# Patient Record
Sex: MALE | Race: WHITE | Employment: OTHER | URBAN - METROPOLITAN AREA
[De-identification: names, ages, dates, MRNs, and addresses within clinical notes are randomized per-mention and may not be internally consistent; named-entity substitution may affect disease eponyms.]

---

## 2017-08-07 ENCOUNTER — TELEPHONE (OUTPATIENT)
Dept: FAMILY MEDICINE CLINIC | Age: 62
End: 2017-08-07

## 2017-08-07 DIAGNOSIS — Z00.00 EXAMINATION, MEDICAL, GENERAL: ICD-10-CM

## 2017-08-07 DIAGNOSIS — E78.5 HYPERLIPIDEMIA, UNSPECIFIED HYPERLIPIDEMIA TYPE: Primary | ICD-10-CM

## 2017-08-07 DIAGNOSIS — Z12.5 SCREENING FOR PROSTATE CANCER: ICD-10-CM

## 2017-09-02 LAB
A/G RATIO: 0.9 (ref 1–2)
ALBUMIN SERPL-MCNC: 8.4 G/DL (ref 6.4–8.2)
ALBUMIN SERUM: 3.9 G/DL (ref 3.4–5)
ALP BLD-CCNC: 68 U/L (ref 45–117)
ALT SERPL-CCNC: 26 U/L (ref 12–78)
ANION GAP SERPL CALCULATED.3IONS-SCNC: 6 MMOL/L (ref 7–16)
AST SERPL-CCNC: 22 U/L (ref 15–37)
BILIRUBIN: 0.8 MG/DL (ref 0.2–1)
BUN BLDV-MCNC: 12 MG/DL (ref 7–18)
CALCIUM SERPL-MCNC: 8.9 MG/DL (ref 8.5–10.1)
CHLORIDE BLD-SCNC: 100 MMOL/L (ref 98–107)
CHOLESTEROL, STONE: 202 MG/DL
CO2: 30 MMOL/L (ref 21–32)
CREATININE + EGFR PANEL: 1.3 MG/DL (ref 0.6–1.3)
GFR AFRICAN AMERICAN: > 60 ML/MIN/1.73M2
GFR NON-AFRICAN AMERICAN: 56 ML/MIN/1.73M2
GLOBULIN: 4.5 G/DL (ref 2.6–4.2)
GLUCOSE: 99 MG/DL (ref 74–106)
HDLC SERPL-MCNC: 51 MG/DL (ref 40–60)
LDL CHOLESTEROL CALCULATED: 138 MG/DL (ref 0–99)
POTASSIUM SERPL-SCNC: 4.4 MMOL/L (ref 3.5–5.1)
PROSTATE SPECIFIC ANTIGEN PERCENT FREE: 1.33 NG/ML (ref 0.01–4)
SODIUM BLD-SCNC: 136 MMOL/L (ref 136–145)
TRIGL SERPL-MCNC: 65 MG/DL (ref 0–149)
VLDLC SERPL CALC-MCNC: 13 MG/DL (ref 0–40)

## 2017-09-11 NOTE — PRE-PROCEDURE INSTRUCTIONS
Patient not reached. Preop instructions left on voice mail.     -Date,time,place given  -Nothing to eat or drink after midnight  -Responsible adult 25 or older to stay on site while you are here and drive you home and stay with you after  -Follow any instructions your doctors office has given you  -Bring a complete list of all your medications and supplements  -If you normally take the following medications in the morning please do so with a small    sip of water-heart,blood pressure,seizure,breathing or thyroid-avoid water pillls and any   medication ending in \"pril\"-you may use your inhalers  -Take half of your normal dose of any long acting insulins the night before-do not take    any diabetic medications in the morning  -Follow your doctors instructions regarding blood thinners  -Any questions call your surgeons office  PAT Dept has ordered labs/ekg based on limited history available to us. After patient interview is completed additional labs etc.may be needed and will need to be ordered per anesthesia or per anesthesia protocol

## 2017-10-03 ENCOUNTER — OFFICE VISIT (OUTPATIENT)
Dept: FAMILY MEDICINE CLINIC | Age: 62
End: 2017-10-03

## 2017-10-03 VITALS
SYSTOLIC BLOOD PRESSURE: 154 MMHG | HEART RATE: 73 BPM | BODY MASS INDEX: 31.58 KG/M2 | OXYGEN SATURATION: 97 % | DIASTOLIC BLOOD PRESSURE: 110 MMHG | HEIGHT: 75 IN | WEIGHT: 254 LBS

## 2017-10-03 DIAGNOSIS — M25.561 CHRONIC PAIN OF RIGHT KNEE: ICD-10-CM

## 2017-10-03 DIAGNOSIS — N52.9 ERECTILE DYSFUNCTION, UNSPECIFIED ERECTILE DYSFUNCTION TYPE: ICD-10-CM

## 2017-10-03 DIAGNOSIS — J30.81 ALLERGIC RHINITIS DUE TO ANIMAL HAIR AND DANDER: ICD-10-CM

## 2017-10-03 DIAGNOSIS — G89.29 CHRONIC PAIN OF RIGHT KNEE: ICD-10-CM

## 2017-10-03 DIAGNOSIS — R30.0 DYSURIA: ICD-10-CM

## 2017-10-03 DIAGNOSIS — R03.0 ELEVATED BLOOD PRESSURE READING: ICD-10-CM

## 2017-10-03 DIAGNOSIS — Z00.00 PREVENTATIVE HEALTH CARE: Primary | ICD-10-CM

## 2017-10-03 DIAGNOSIS — R13.10 DYSPHAGIA, UNSPECIFIED TYPE: ICD-10-CM

## 2017-10-03 LAB
APPEARANCE FLUID: CLEAR
BILIRUBIN, POC: NORMAL
BLOOD URINE, POC: 0
CLARITY, POC: CLEAR
COLOR, POC: YELLOW
GLUCOSE URINE, POC: 0
KETONES, POC: 0
LEUKOCYTE EST, POC: 0
NITRITE, POC: 0
PH, POC: 5
PROTEIN, POC: NORMAL
SPECIFIC GRAVITY, POC: 1.03
UROBILINOGEN, POC: 0

## 2017-10-03 PROCEDURE — 81002 URINALYSIS NONAUTO W/O SCOPE: CPT | Performed by: FAMILY MEDICINE

## 2017-10-03 PROCEDURE — 99386 PREV VISIT NEW AGE 40-64: CPT | Performed by: FAMILY MEDICINE

## 2017-10-03 PROCEDURE — 90471 IMMUNIZATION ADMIN: CPT | Performed by: FAMILY MEDICINE

## 2017-10-03 PROCEDURE — 90715 TDAP VACCINE 7 YRS/> IM: CPT | Performed by: FAMILY MEDICINE

## 2017-10-03 ASSESSMENT — PATIENT HEALTH QUESTIONNAIRE - PHQ9
2. FEELING DOWN, DEPRESSED OR HOPELESS: 0
1. LITTLE INTEREST OR PLEASURE IN DOING THINGS: 0
SUM OF ALL RESPONSES TO PHQ QUESTIONS 1-9: 0
SUM OF ALL RESPONSES TO PHQ9 QUESTIONS 1 & 2: 0

## 2017-10-03 ASSESSMENT — ENCOUNTER SYMPTOMS
TROUBLE SWALLOWING: 0
WHEEZING: 0
EYE PAIN: 0
ABDOMINAL DISTENTION: 0
COUGH: 0
CONSTIPATION: 0
VOMITING: 0
BACK PAIN: 0
NAUSEA: 0
EYE DISCHARGE: 0
DIARRHEA: 0
ROS SKIN COMMENTS: NO CHANGES IN MOLES. NO SKIN LESIONS.
SHORTNESS OF BREATH: 0
ABDOMINAL PAIN: 0

## 2017-10-03 NOTE — MR AVS SNAPSHOT
· Eat 4 to 5 servings of fruit each day. A serving is 1 medium-sized piece of fruit, ½ cup chopped or canned fruit, 1/4 cup dried fruit, or 4 ounces (½ cup) of fruit juice. Choose fruit more often than fruit juice. · Eat 4 to 5 servings of vegetables each day. A serving is 1 cup of lettuce or raw leafy vegetables, ½ cup of chopped or cooked vegetables, or 4 ounces (½ cup) of vegetable juice. Choose vegetables more often than vegetable juice. · Get 2 to 3 servings of low-fat and fat-free dairy each day. A serving is 8 ounces of milk, 1 cup of yogurt, or 1 ½ ounces of cheese. · Eat 6 to 8 servings of grains each day. A serving is 1 slice of bread, 1 ounce of dry cereal, or ½ cup of cooked rice, pasta, or cooked cereal. Try to choose whole-grain products as much as possible. · Limit lean meat, poultry, and fish to 2 servings each day. A serving is 3 ounces, about the size of a deck of cards. · Eat 4 to 5 servings of nuts, seeds, and legumes (cooked dried beans, lentils, and split peas) each week. A serving is 1/3 cup of nuts, 2 tablespoons of seeds, or ½ cup of cooked beans or peas. · Limit fats and oils to 2 to 3 servings each day. A serving is 1 teaspoon of vegetable oil or 2 tablespoons of salad dressing. · Limit sweets and added sugars to 5 servings or less a week. A serving is 1 tablespoon jelly or jam, ½ cup sorbet, or 1 cup of lemonade. · Eat less than 2,300 milligrams (mg) of sodium a day. If you limit your sodium to 1,500 mg a day, you can lower your blood pressure even more. Tips for success  · Start small. Do not try to make dramatic changes to your diet all at once. You might feel that you are missing out on your favorite foods and then be more likely to not follow the plan. Make small changes, and stick with them. Once those changes become habit, add a few more changes. · Try some of the following:  ¨ Make it a goal to eat a fruit or vegetable at every meal and at snacks. This will make it easy to get the recommended amount of fruits and vegetables each day. ¨ Try yogurt topped with fruit and nuts for a snack or healthy dessert. ¨ Add lettuce, tomato, cucumber, and onion to sandwiches. ¨ Combine a ready-made pizza crust with low-fat mozzarella cheese and lots of vegetable toppings. Try using tomatoes, squash, spinach, broccoli, carrots, cauliflower, and onions. ¨ Have a variety of cut-up vegetables with a low-fat dip as an appetizer instead of chips and dip. ¨ Sprinkle sunflower seeds or chopped almonds over salads. Or try adding chopped walnuts or almonds to cooked vegetables. ¨ Try some vegetarian meals using beans and peas. Add garbanzo or kidney beans to salads. Make burritos and tacos with mashed nixon beans or black beans. Where can you learn more? Go to https://[email or web address].Panasas. org and sign in to your [employer] account. Enter G143 in the Subject Company box to learn more about \"DASH Diet: Care Instructions. \"     If you do not have an account, please click on the \"Sign Up Now\" link. Current as of: April 3, 2017  Content Version: 11.3  © 8279-5940 [email or web address], [employer]orporated. Care instructions adapted under license by [facility] ([facility]). If you have questions about a medical condition or this instruction, always ask your healthcare professional. [place] [address] any warranty or liability for your use of this information. Medications and Orders      Your Current Medications Are              Glucosamine-Chondroitin (OSTEO BI-FLEX REGULAR STRENGTH PO) Take by mouth    multivitamin (THERAGRAN) per tablet Take 1 tablet by mouth daily.       prednisoLONE acetate (PRED FORTE) 1 % ophthalmic suspension       Allergies              Deconamine [Chlorpheniramine-pseudoeph] Other (See Comments)    Hallucinations    Dorzolamide     Increased eye pressure    Viagra [Sildenafil Citrate] Other (See Comments)    Temporary blindness 4. Enter your Social Security Number ([id number]) and Date of Birth (mm/dd/yyyy) as indicated and click Submit. You will be taken to the next sign-up page. 5. Create a [employer] ID. This will be your [employer] login ID and cannot be changed, so think of one that is secure and easy to remember. 6. Create a [employer] password. You can change your password at any time. 7. Enter your Password Reset Question and Answer. This can be used at a later time if you forget your password. 8. Enter your e-mail address. You will receive e-mail notification when new information is available in [address] 9. Click Sign Up. You can now view your medical record. Additional Information  If you have questions, please contact the physician practice where you receive care. Remember, [employer] is NOT to be used for urgent needs. For medical emergencies, dial 911. For questions regarding your [employer] account call [phone]. If you have a clinical question, please call your doctor's office.

## 2017-10-03 NOTE — PROGRESS NOTES
Edward Celeste  : 1955  Encounter date: 10/3/2017    This is a 63 y.o. male who presents to [facility] care. Chief Complaint   Patient presents with    Annual Exam       History of present illness:    Has colonoscopy scheduled tomorrow with Dr. Arnold Good. Had polyp removed 5 yrs ago; tomorrow is 5 year check up. Right knee is bothering him. Has some arthritis in knee. Was using treadmill in morning just walking, and can't do that anymore. Causing pain. Wears knee brace during day which keeps it from hurting at night. Pain is medial. Bothers him more when up and on knee. Occasionally takes aleve. Swelling better with brace. Had \"blood poisoning\" in that knee in high school. Wants to know if exercise is making it worse or if he should continue. Does use stationary bike on regular basis - 20 minutes or so. Does check blood pressure at home - usually 130's/90. Has been in that range about last 4 years. Usually over 90 for diastolic. Past Medical History:   Diagnosis Date    Erectile dysfunction     Hyperlipidemia      Past Surgical History:   Procedure Laterality Date    COLONOSCOPY  2012    RETINAL DETACHMENT SURGERY      Bilateral- (L) Blind-Cosmetic Contact; 12 surgeries on left; 8 on right     Allergies   Allergen Reactions    Deconamine [Chlorpheniramine-Pseudoeph] Other (See Comments)     Hallucinations    Dorzolamide      Increased eye pressure    Viagra [Sildenafil Citrate] Other (See Comments)     Temporary blindness     Outpatient Prescriptions Marked as Taking for the 10/3/17 encounter (Office Visit) with Stephanie Lennon MD   Medication Sig Dispense Refill    Glucosamine-Chondroitin (OSTEO BI-FLEX REGULAR STRENGTH PO) Take by mouth      multivitamin (THERAGRAN) per tablet Take 1 tablet by mouth daily.         prednisoLONE acetate (PRED FORTE) 1 % ophthalmic suspension        Social History   Substance Use Topics    Smoking status: Never Smoker    Smokeless tobacco: Never Used   [facility] Encounters:   10/03/17 (!) 142/90   10/31/12 110/80     Wt Readings from Last 3 Encounters:   10/03/17 254 lb (115.2 kg)   10/31/12 251 lb (113.9 kg)       Physical Exam   Constitutional: He is oriented to person, place, and time. He appears well-developed and well-nourished. No distress. HENT:   Head: Normocephalic and atraumatic. Right Ear: Hearing, tympanic membrane, external ear and ear canal normal.   Left Ear: Hearing, tympanic membrane, external ear and ear canal normal.   Nose: Nose normal. No mucosal edema. Mouth/Throat: Uvula is midline and oropharynx is clear and moist.   Eyes: Conjunctivae and EOM are normal. Pupils are equal, round, and reactive to light. Cataract left eye   Neck: Trachea normal. No thyroid mass and no thyromegaly present. Cardiovascular: Normal rate and regular rhythm. Pulses:       Radial pulses are 2+ on the right side, and 2+ on the left side. Dorsalis pedis pulses are 2+ on the right side, and 2+ on the left side. Pulmonary/Chest: Effort normal and breath sounds normal.   Abdominal: Soft. Normal appearance and bowel sounds are normal. There is no tenderness. There is no CVA tenderness. No hernia. Hernia confirmed negative in the right inguinal area and confirmed negative in the left inguinal area. Genitourinary: Testes normal and penis normal. Right testis shows no mass, no swelling and no tenderness. Right testis is descended. Left testis shows no mass, no swelling and no tenderness. Left testis is descended. Circumcised. Musculoskeletal: Normal range of motion. He exhibits no tenderness. Right knee has significant crepitance on exam. Knee exam is stable without induced tenderness. Lymphadenopathy:     He has no cervical adenopathy. Neurological: He is alert and oriented to person, place, and time. He has normal strength. He displays no tremor. No cranial nerve deficit. He exhibits normal muscle tone.    Reflex Scores:       Bicep reflexes are 2+ on the right side and 2+ on the left side. Brachioradialis reflexes are 2+ on the right side and 2+ on the left side. Patellar reflexes are 2+ on the right side and 2+ on the left side. Psychiatric: He has a normal mood and affect. His speech is normal and behavior is normal.       Assessment/Plan:    1.dysphagia  We did try to get patient in for esophagogastroduodenoscopy with his colonoscopy tomorrow but they're unable to do so. We will keep the esophagogastroduodenoscopy order an system suspect he has some stricture causing the occasional dysphagia.  - EGD    2. Elevated blood pressure reading  Blood pressure and recheck in the office was in the 150s / 110s. This was checked twice during the visit. Patient prefers to make lifestyle changes to his blood pressure but will return on Friday with home cuff to compare against ours and documented readings at home. If still elevated on this recheck recommended we start medication for blood pressure possibly lisinopril to keep it healthier eating. He is working on losing weight and we discussed that if he doesn't lose weight we may be able to take him off of blood pressure medication in the future. 3. Chronic pain of right knee  Right knee pain has worsened over the last few years. On exam knee feels to be arthritic. We will start with a baseline x-ray and consider further treatment pending this. He is not interested in surgery at this point however he may benefit from a steroid injection to the left knee. - XR KNEE RIGHT (3 VIEWS); Future    4. Preventative health care  He will get flu shot through work. Otherwise he is up-to-date with preventative healthcare. 5. Erectile dysfunction, unspecified erectile dysfunction type  Discussed if he is interested in treatment they're more localized treatment options including injections and pills that would not effect eyes like the systemic Viagra or Cialis would.  If he is interested down the road we can consider discussion with urology about these treatment options. 6. Allergic rhinitis due to animal hair and dander  Controlled with Mucinex. 7. Dysuria  Plan UA in office today. Normal.  - POC URINE with Microscopic    Return in about 3 days (around 10/6/2017) for nurse visit-bp recheck.

## 2017-10-03 NOTE — PATIENT INSTRUCTIONS
cup of yogurt, or 1 ½ ounces of cheese. · Eat 6 to 8 servings of grains each day. A serving is 1 slice of bread, 1 ounce of dry cereal, or ½ cup of cooked rice, pasta, or cooked cereal. Try to choose whole-grain products as much as possible. · Limit lean meat, poultry, and fish to 2 servings each day. A serving is 3 ounces, about the size of a deck of cards. · Eat 4 to 5 servings of nuts, seeds, and legumes (cooked dried beans, lentils, and split peas) each week. A serving is 1/3 cup of nuts, 2 tablespoons of seeds, or ½ cup of cooked beans or peas. · Limit fats and oils to 2 to 3 servings each day. A serving is 1 teaspoon of vegetable oil or 2 tablespoons of salad dressing. · Limit sweets and added sugars to 5 servings or less a week. A serving is 1 tablespoon jelly or jam, ½ cup sorbet, or 1 cup of lemonade. · Eat less than 2,300 milligrams (mg) of sodium a day. If you limit your sodium to 1,500 mg a day, you can lower your blood pressure even more. Tips for success  · Start small. Do not try to make dramatic changes to your diet all at once. You might feel that you are missing out on your favorite foods and then be more likely to not follow the plan. Make small changes, and stick with them. Once those changes become habit, add a few more changes. · Try some of the following:  ¨ Make it a goal to eat a fruit or vegetable at every meal and at snacks. This will make it easy to get the recommended amount of fruits and vegetables each day. ¨ Try yogurt topped with fruit and nuts for a snack or healthy dessert. ¨ Add lettuce, tomato, cucumber, and onion to sandwiches. ¨ Combine a ready-made pizza crust with low-fat mozzarella cheese and lots of vegetable toppings. Try using tomatoes, squash, spinach, broccoli, carrots, cauliflower, and onions. ¨ Have a variety of cut-up vegetables with a low-fat dip as an appetizer instead of chips and dip. ¨ Sprinkle sunflower seeds or chopped almonds over salads.  Or try adding chopped walnuts or almonds to cooked vegetables. ¨ Try some vegetarian meals using beans and peas. Add garbanzo or kidney beans to salads. Make burritos and tacos with mashed nixon beans or black beans. Where can you learn more? Go to https://chpepiceweb.[email or web address] org and sign in to your [employer] account. Enter I090 in the [employer] Me[employer] box to learn more about \"DASH Diet: Care Instructions. \"     If you do not have an account, please click on the \"Sign Up Now\" link. Current as of: April 3, 2017  Content Version: 11.3  © 3434-9820 [employer], Incorporated. Care instructions adapted under license by [facility] ([facility]). If you have questions about a medical condition or this instruction, always ask your healthcare professional. [place] any warranty or liability for your use of this information.

## 2017-10-04 ENCOUNTER — HOSPITAL ENCOUNTER (OUTPATIENT)
Dept: ENDOSCOPY | Age: 62
Discharge: HOME | End: 2017-10-04
Attending: INTERNAL MEDICINE | Admitting: INTERNAL MEDICINE

## 2017-10-04 RX ORDER — SODIUM CHLORIDE 9 MG/ML
INJECTION, SOLUTION INTRAVENOUS CONTINUOUS
Status: DISCONTINUED | OUTPATIENT
Start: 2017-10-04 | End: 2017-10-05 | Stop reason: HOSPADM

## 2017-10-04 RX ORDER — SODIUM CHLORIDE 0.9 % (FLUSH) 0.9 %
10 SYRINGE (ML) INJECTION PRN
Status: DISCONTINUED | OUTPATIENT
Start: 2017-10-04 | End: 2017-10-05 | Stop reason: HOSPADM

## 2017-10-04 RX ORDER — SODIUM CHLORIDE 0.9 % (FLUSH) 0.9 %
10 SYRINGE (ML) INJECTION EVERY 12 HOURS SCHEDULED
Status: DISCONTINUED | OUTPATIENT
Start: 2017-10-04 | End: 2017-10-05 | Stop reason: HOSPADM

## 2017-10-04 NOTE — ANESTHESIA PRE-OP
[facility] Anesthesiology  Pre-Anesthesia Evaluation/Consultation       Name:  Marcel Hercules                                         Age:  63 y.o. MRN:    [id number]           Procedure (Scheduled): COLONOSCOPY   Surgeon:     Dr. Fish Mota MD     Allergies   Allergen Reactions    Deconamine [Chlorpheniramine-Pseudoeph] Other (See Comments)     Hallucinations    Dorzolamide      Increased eye pressure    Viagra [Sildenafil Citrate] Other (See Comments)     Temporary blindness     Patient Active Problem List   Diagnosis    Allergic rhinitis    Erectile dysfunction    Hyperlipidemia     Past Medical History:   Diagnosis Date    Erectile dysfunction     Hyperlipidemia      Past Surgical History:   Procedure Laterality Date    COLONOSCOPY  9/2012    RETINAL DETACHMENT SURGERY      Bilateral- (L) Blind-Cosmetic Contact; 12 surgeries on left; 8 on right     Social History   Substance Use Topics    Smoking status: Never Smoker    Smokeless tobacco: Never Used    Alcohol use Yes      Comment: social       Prior to Admission medications    Medication Sig Start Date End Date Taking? Authorizing Provider   Glucosamine-Chondroitin (OSTEO BI-FLEX REGULAR STRENGTH PO) Take by mouth    Historical Provider, MD   multivitamin [facility]) per tablet Take 1 tablet by mouth daily. Historical Provider, MD   prednisoLONE acetate (PRED FORTE) 1 % ophthalmic suspension  8/14/12   Historical Provider, MD       Current Outpatient Prescriptions   Medication Sig Dispense Refill    Glucosamine-Chondroitin (OSTEO BI-FLEX REGULAR STRENGTH PO) Take by mouth      multivitamin (THERAGRAN) per tablet Take 1 tablet by mouth daily.         prednisoLONE acetate (PRED FORTE) 1 % ophthalmic suspension        Current Facility-Administered Medications   Medication Dose Route Frequency Provider Last Rate Last Dose    0.9 % sodium chloride infusion   Intravenous Continuous Jm Duncan MD        sodium chloride flush 0.9 % injection 10 mL  10 mL Intravenous 2 times per day Ella Carrillo MD        sodium chloride flush 0.9 % injection 10 mL  10 mL Intravenous PRN Ella Carrillo MD           Vital Signs  (Current) There were no vitals filed for this visit.   (for past 48 hrs)  BP  Min: 142/90   Min taken time: 10/03/17 0743  Max: 154/110   Max taken time: 10/03/17 0922  Pulse  Av  Min: 73   Min taken time: 10/03/17 0743  Max: 73   Max taken time: 10/03/17 0743  SpO2  Av %  Min: 97 %   Min taken time: 10/03/17 0743  Max: 97 %   Max taken time: 10/03/17 0743  (last three values)   BP Readings from Last 3 Encounters:   10/03/17 (!) 154/110   10/31/12 110/80       CBC  Lab Results   Component Value Date    WBC 5.7 09/10/2012    RBC 4.61 09/10/2012    HGB 14.2 09/10/2012    HCT 42.5 09/10/2012    MCV 92.2 09/10/2012    RDW 13.7 09/10/2012     09/10/2012       CMP    Lab Results   Component Value Date     2017    K 4.4 2017     2017    CO2 30 2017    BUN 12 2017    CREATININE 0.9 09/10/2012    GFRAA > 60 2017    GFRAA >60 09/10/2012    AGRATIO 0.9 2017    LABGLOM 56 2017    GLUCOSE 99 2017    PROT 7.2 09/10/2012    CALCIUM 8.9 2017    BILITOT 0.8 2017    ALKPHOS 68 2017    AST 22 2017    ALT 26 2017       BMP    Lab Results   Component Value Date     2017    K 4.4 2017     2017    CO2 30 2017    BUN 12 2017    CREATININE 0.9 09/10/2012    CALCIUM 8.9 2017    GFRAA > 60 2017    GFRAA >60 09/10/2012    LABGLOM 56 2017    GLUCOSE 99 2017       Coa   Lab Results   Component Value Date    PROTIME 11.5 09/10/2012    INR 1.01 09/10/2012    APTT 32.1 09/10/2012       HCG (If Applicable) No results found for: PREGTESTUR, PREGSERUM, HCG, HCGQUANT     ABGs  No results found for: PHART, PO2ART, UOH0DSS, WPO6ZVZ, BEART, N1XZENMV     Type & Screen (If Applicable)  No results found for: Jaun Oconnor  No results for input(s): GLUCOSE in the last 72 hours. NPO Status  > 8 hours                       BMI  There is no height or weight on file to calculate BMI. Estimated body mass index is 32.18 kg/(m^2) as calculated from the following:    Height as of 10/3/17: 6' 2.5\" (1.892 m). Weight as of 10/3/17: 254 lb (115.2 kg). Additional Testing (Echo, Stress, ECG, PFTs, etc)        Anesthesia Evaluation  Patient summary reviewed and Nursing notes reviewed no history of anesthetic complications:   Airway: Mallampati: II  TM distance: >3 FB   Neck ROM: full  Mouth opening: > = 3 FB Dental: normal exam         Pulmonary:negative ROS and normal exam  breath sounds clear to auscultation      (-) asthma       Cardiovascular:    (+) hypertension (no med currently, being considered for txmt):, hyperlipidemia    (-) past MI, CAD, CABG/stent, dysrhythmias,  angina and  CHF      Rhythm: regular  Rate: normal                 Neuro/Psych:   {neg ROS     (-) CVA  GI/Hepatic/Renal: neg ROS       (-) GERD     Endo/Other: negative ROS         Abdominal:                    Anesthesia Plan    ASA 2     MAC     intravenous induction   Anesthetic plan and risks discussed with patient. Plan discussed with CRNA. DOS STAFF ADDENDUM:    Pt seen and examined, chart reviewed (including anesthesia, drug and allergy history). No interval changes to history and physical examination. Anesthetic plan, risks, benefits, alternatives, and personnel involved discussed with patient. Patient verbalized an understanding and agrees to proceed.       Aurelio Strange MD  October 4, 2017  6:37 AM

## 2017-10-06 ENCOUNTER — NURSE ONLY (OUTPATIENT)
Dept: FAMILY MEDICINE CLINIC | Age: 62
End: 2017-10-06

## 2017-10-06 DIAGNOSIS — R03.0 ELEVATED BLOOD PRESSURE READING: ICD-10-CM

## 2017-10-10 ENCOUNTER — TELEPHONE (OUTPATIENT)
Dept: FAMILY MEDICINE CLINIC | Age: 62
End: 2017-10-10

## 2017-10-10 NOTE — TELEPHONE ENCOUNTER
Dr. Monica Wu is adding lisinopril 20 mg for patient to take daily. I need to know what pharmacy to send the medication. Left message for patient to call back.

## 2017-10-12 RX ORDER — LISINOPRIL 20 MG/1
20 TABLET ORAL DAILY
Qty: 30 TABLET | Refills: 3 | Status: SHIPPED | OUTPATIENT
Start: 2017-10-12 | End: 2018-07-24

## 2017-10-13 PROBLEM — R03.0 ELEVATED BLOOD PRESSURE READING: Status: ACTIVE | Noted: 2017-10-13

## 2017-10-13 NOTE — PROGRESS NOTES
Patient is here for a blood pressure check. It was 162/110 in the left arm and 151/106 in the left wrist with patient machine.   Patient needs to start taking lisinopril 20 mg daily per Magalie Franco

## 2018-07-24 ENCOUNTER — OFFICE VISIT (OUTPATIENT)
Dept: FAMILY MEDICINE CLINIC | Age: 63
End: 2018-07-24

## 2018-07-24 VITALS
OXYGEN SATURATION: 98 % | DIASTOLIC BLOOD PRESSURE: 82 MMHG | WEIGHT: 248 LBS | SYSTOLIC BLOOD PRESSURE: 108 MMHG | HEART RATE: 64 BPM | BODY MASS INDEX: 31.42 KG/M2

## 2018-07-24 DIAGNOSIS — J30.81 CHRONIC ALLERGIC RHINITIS DUE TO ANIMAL HAIR AND DANDER: ICD-10-CM

## 2018-07-24 DIAGNOSIS — R03.0 ELEVATED BLOOD PRESSURE READING: Primary | ICD-10-CM

## 2018-07-24 DIAGNOSIS — E78.5 HYPERLIPIDEMIA, UNSPECIFIED HYPERLIPIDEMIA TYPE: ICD-10-CM

## 2018-07-24 DIAGNOSIS — Z80.42 FAMILY HISTORY OF PROSTATE CANCER: ICD-10-CM

## 2018-07-24 PROCEDURE — 99214 OFFICE O/P EST MOD 30 MIN: CPT | Performed by: FAMILY MEDICINE

## 2018-07-24 RX ORDER — LISINOPRIL 5 MG/1
5 TABLET ORAL DAILY
Qty: 30 TABLET | Refills: 2 | Status: SHIPPED | OUTPATIENT
Start: 2018-07-24 | End: 2019-03-20 | Stop reason: SDUPTHER

## 2018-07-24 RX ORDER — PREDNISOLONE ACETATE 10 MG/ML
SUSPENSION/ DROPS OPHTHALMIC
COMMUNITY
End: 2018-07-24

## 2018-07-24 ASSESSMENT — ENCOUNTER SYMPTOMS
SHORTNESS OF BREATH: 0
ABDOMINAL PAIN: 0
COUGH: 0

## 2018-10-04 DIAGNOSIS — E83.51 LOW CALCIUM LEVELS: Primary | ICD-10-CM

## 2018-10-04 LAB — PARATHYROID HORMONE INTACT: 68.7 PG/ML (ref 10–69)

## 2018-10-12 DIAGNOSIS — E83.51 LOW CALCIUM LEVELS: Primary | ICD-10-CM

## 2019-03-20 DIAGNOSIS — R03.0 ELEVATED BLOOD PRESSURE READING: ICD-10-CM

## 2019-03-20 RX ORDER — LISINOPRIL 5 MG/1
5 TABLET ORAL DAILY
Qty: 30 TABLET | Refills: 2 | Status: SHIPPED | OUTPATIENT
Start: 2019-03-20 | End: 2019-05-20 | Stop reason: SDUPTHER

## 2019-05-07 ENCOUNTER — TELEPHONE (OUTPATIENT)
Dept: FAMILY MEDICINE CLINIC | Age: 64
End: 2019-05-07

## 2019-05-07 DIAGNOSIS — R13.10 DIFFICULTY SWALLOWING SOLIDS: Primary | ICD-10-CM

## 2019-05-07 NOTE — TELEPHONE ENCOUNTER
Pt used to be a Dr. Maty Warner patient and needs a referral     [place] [place] [address]. Ofelia Akhtar      Fax [phone]    Phone.   [address]

## 2019-05-08 NOTE — TELEPHONE ENCOUNTER
Attempted to make follow up phone call to patient - unavailable - left voicemail with office call back number.     Need to speak with patient directly for appropriate diagnosis code for referral.

## 2019-05-08 NOTE — TELEPHONE ENCOUNTER
Food is getting caught in the patinet esophagus for about a month.       Appt is scheduled for 5/23/19 @ 8:30am

## 2019-05-08 NOTE — TELEPHONE ENCOUNTER
Attempted to make follow up phone call to patient - unavailable - left voicemail with office call back number. Calling to clarify reasoning for referral need.

## 2019-05-09 NOTE — PROGRESS NOTES
Patient not reached. Preop instructions left on voice mail. Number_[phone]________      DATE___5/23/19_____ TIME_1000_______ARRIVAL___FEC  0830______      Nothing to eat or drink after midnight the night before,except for what the prep instructions call for. If you do not have the instructions or do not understand them please contact your doctors office. Follow any instructions your doctors office has given you including what medications to take the AM of your procedure and which ones to hold. You may use your inhalers. If you take a long acting insulin the rodri prior please cut the dose in half and take no diabetic medications that AM.Follow specific doctors office instructions regarding blood thinners and if they want you to hold and for how long. If you are on a blood thinner and have no instructions please contact the office and ask. Dress comfortably,bring your insurance card,picture ID,and a complete list of medications, including supplements. You must have a responsible adult to stay with you during the procedure,drive you home and stay with you. [facility] phone number [phone] for any questions.

## 2019-05-10 ENCOUNTER — ANESTHESIA EVENT (OUTPATIENT)
Dept: ENDOSCOPY | Age: 64
End: 2019-05-10
Payer: COMMERCIAL

## 2019-05-20 ENCOUNTER — OFFICE VISIT (OUTPATIENT)
Dept: PRIMARY CARE CLINIC | Age: 64
End: 2019-05-20
Payer: COMMERCIAL

## 2019-05-20 VITALS
WEIGHT: 252.9 LBS | HEIGHT: 73 IN | HEART RATE: 66 BPM | SYSTOLIC BLOOD PRESSURE: 135 MMHG | OXYGEN SATURATION: 98 % | DIASTOLIC BLOOD PRESSURE: 93 MMHG | BODY MASS INDEX: 33.52 KG/M2

## 2019-05-20 DIAGNOSIS — I10 ESSENTIAL HYPERTENSION: Primary | ICD-10-CM

## 2019-05-20 DIAGNOSIS — E66.09 CLASS 1 OBESITY DUE TO EXCESS CALORIES WITHOUT SERIOUS COMORBIDITY WITH BODY MASS INDEX (BMI) OF 33.0 TO 33.9 IN ADULT: ICD-10-CM

## 2019-05-20 PROBLEM — E66.811 CLASS 1 OBESITY DUE TO EXCESS CALORIES WITHOUT SERIOUS COMORBIDITY WITH BODY MASS INDEX (BMI) OF 33.0 TO 33.9 IN ADULT: Status: ACTIVE | Noted: 2019-05-20

## 2019-05-20 LAB — HBA1C MFR BLD: 6 %

## 2019-05-20 PROCEDURE — 83036 HEMOGLOBIN GLYCOSYLATED A1C: CPT | Performed by: NURSE PRACTITIONER

## 2019-05-20 PROCEDURE — 99214 OFFICE O/P EST MOD 30 MIN: CPT | Performed by: NURSE PRACTITIONER

## 2019-05-20 RX ORDER — LISINOPRIL 5 MG/1
5 TABLET ORAL DAILY
Qty: 30 TABLET | Refills: 5 | Status: SHIPPED | OUTPATIENT
Start: 2019-05-20 | End: 2019-11-18

## 2019-05-20 ASSESSMENT — PATIENT HEALTH QUESTIONNAIRE - PHQ9
SUM OF ALL RESPONSES TO PHQ QUESTIONS 1-9: 0
SUM OF ALL RESPONSES TO PHQ9 QUESTIONS 1 & 2: 0
SUM OF ALL RESPONSES TO PHQ QUESTIONS 1-9: 0
2. FEELING DOWN, DEPRESSED OR HOPELESS: 0
1. LITTLE INTEREST OR PLEASURE IN DOING THINGS: 0

## 2019-05-20 NOTE — PROGRESS NOTES
2019    Chief Complaint   Patient presents with    Establish Care    Hyperlipidemia    Other     Buzzing in ears, when it is really quiet        Kirby Weston (:  1955) is a 60 y.o. male, here for evaluation of the following medical concerns:      HPI  1. Presents to clinic today to establish care with me. Treated for Chronic Condition - HTN - Lisinopril. HTN  Takes B/P pill in the afternoon. Has held B/P pill over the past 4 days in expectation of his up coming EGD. Has previously been able to discontinue blood pressure medication when was 15 lbs lighter - has recently gained weight since January after bought of pneumonia - has plans to return to healthier diet and restarting exercise plan. Will continue to monitor blood pressures once starts with weight loss. 2. Reports ringing in the ears bilateral - started approximately 5 months ago - prior to being diagnosed with pneumonia. Light buzzing. Was previsouly used ear buds - stopped - thought it was the cause. Denies any associated hearing loss. 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 Is annoying but bearable. 3. EGD - scope scheduled for Thursday - swallowing issues. Referral placed a few weeks prior. Per patient initially would have difficulty with swallowing boluses of food approximately once monthly over the past 1-2 years. Reports typically when he drinks a cold drink with a hot food item - will get stuck - will have to expel food item, wait 10 minutes and then he can resume eating. Has been increasing over the past 2-3 months happening almost 3-4 times per week as opposed to once per month. Treatment Adherence:   Medication compliance:  Always - doesn't have to take every day when weight is down  Diet compliance:  Minimal   Weight trend: Recent gain of 15 lbs after having pneumonia in 2019  Current exercise: None - stopped after having pneumonia in January.   Also has complaints of right knee pain/osteoarthritis       Review of Systems   Constitutional: Negative for activity change, appetite change, chills, fatigue, fever and unexpected weight change. HENT: Negative for congestion, dental problem, ear pain, rhinorrhea and sore throat. Eyes: Negative for pain, discharge, redness and itching. Respiratory: Negative for cough, chest tightness and shortness of breath. Cardiovascular: Negative for chest pain, palpitations and leg swelling. Gastrointestinal: Negative for abdominal distention, abdominal pain, diarrhea, nausea and vomiting. Musculoskeletal: Negative for arthralgias, joint swelling and myalgias. Skin: Negative for rash and wound. Neurological: Negative for dizziness and headaches. Hematological: Does not bruise/bleed easily. Current Outpatient Medications on File Prior to Visit   Medication Sig Dispense Refill    brimonidine (ALPHAGAN P) 0.1 % SOLN 1 drop 2 times daily      Glucosamine-Chondroitin (OSTEO BI-FLEX REGULAR STRENGTH PO) Take by mouth      multivitamin (THERAGRAN) per tablet Take 1 tablet by mouth daily. No current facility-administered medications on file prior to visit.          Allergies   Allergen Reactions    Deconamine [Chlorpheniramine-Pseudoeph] Other (See Comments)     Hallucinations    Dorzolamide      Increased eye pressure    Viagra [Sildenafil Citrate] Other (See Comments)     Temporary blindness       Past Medical History:   Diagnosis Date    Erectile dysfunction     Hyperlipidemia        Past Surgical History:   Procedure Laterality Date    COLONOSCOPY  9/2012    RETINAL DETACHMENT SURGERY      Bilateral- (L) Blind-Cosmetic Contact; 12 surgeries on left; 8 on right       Social History     Socioeconomic History    Marital status:      Spouse name: Not on file    Number of children: Not on file    Years of education: Not on file    Highest education level: Not on file   Occupational History    Not on file   Social Needs    Financial resource strain: Not on file    Food insecurity:     Worry: Not on file     Inability: Not on file    Transportation needs:     Medical: Not on file     Non-medical: Not on file   Tobacco Use    Smoking status: Never Smoker    Smokeless tobacco: Never Used   Substance and Sexual Activity    Alcohol use: Yes     Comment: social    Drug use: No    Sexual activity: Not on file   Lifestyle    Physical activity:     Days per week: Not on file     Minutes per session: Not on file    Stress: Not on file   Relationships    Social connections:     Talks on phone: Not on file     Gets together: Not on file     Attends Holiness service: Not on file     Active member of club or organization: Not on file     Attends meetings of clubs or organizations: Not on file     Relationship status: Not on file    Intimate partner violence:     Fear of current or ex partner: Not on file     Emotionally abused: Not on file     Physically abused: Not on file     Forced sexual activity: Not on file   Other Topics Concern    Not on file   Social History Narrative    Not on file        Family History   Problem Relation Age of Onset    Heart Disease Father     Kidney Disease Father     Coronary Art Dis Father     Heart Attack Father     Parkinsonism Brother     Cancer Sister         ovarian    Stroke Sister 64    Obesity Sister     Coronary Art Dis Mother     Heart Attack Mother     Prostate Cancer Brother 57       BP (!) 135/93 (Site: Left Upper Arm, Position: Sitting, Cuff Size: Medium Adult)   Pulse 66   Ht 6' 1.25\" (1.861 m)   Wt 252 lb 14.4 oz (114.7 kg)   SpO2 98%   BMI 33.14 kg/m²        Estimated body mass index is 33.14 kg/m² as calculated from the following:    Height as of this encounter: 6' 1.25\" (1.861 m). Weight as of this encounter: 252 lb 14.4 oz (114.7 kg). Physical Exam   Constitutional: He is oriented to person, place, and time.  He appears well-developed and well-nourished. No distress. HENT:   Head: Normocephalic and atraumatic. Right Ear: Hearing, tympanic membrane, external ear and ear canal normal.   Left Ear: Hearing, tympanic membrane, external ear and ear canal normal.   Nose: Nose normal. No mucosal edema. Mouth/Throat: Uvula is midline, oropharynx is clear and moist and mucous membranes are normal.   Eyes: Pupils are equal, round, and reactive to light. Conjunctivae and EOM are normal. Right eye exhibits no discharge. Left eye exhibits no discharge. Neck: Normal range of motion. No tracheal deviation present. Cardiovascular: Normal rate, regular rhythm, S1 normal, S2 normal and normal heart sounds. Pulmonary/Chest: Effort normal and breath sounds normal. No respiratory distress. Abdominal: Soft. Bowel sounds are normal. He exhibits no distension. Musculoskeletal: Normal range of motion. He exhibits no edema. Lymphadenopathy:     He has no cervical adenopathy. Neurological: He is alert and oriented to person, place, and time. Skin: Skin is warm and dry. No rash noted. Psychiatric: He has a normal mood and affect. His speech is normal and behavior is normal. Judgment and thought content normal. Cognition and memory are normal.     Results for POC orders placed in visit on 05/20/19   POCT glycosylated hemoglobin (Hb A1C)   Result Value Ref Range    Hemoglobin A1C 6.0 %     ASSESSMENT/PLAN:  1. Essential hypertension  Continue to monitor blood pressures at home. Focus on healthy diet and lifestyle changes. When starting with weight loss monitor for episodes of hypotension. Can adjust medication dosage once weight loss begins and if b/p readings are low. 2. Class 1 obesity due to excess calories without serious comorbidity with body mass index (BMI) of 33.0 to 33.9 in adult  Focus on healthy diet and exercise plan.   - POCT glycosylated hemoglobin (Hb A1C)     Current Outpatient Medications   Medication Sig Dispense Refill    brimonidine (ALPHAGAN P) 0.1 % SOLN 1 drop 2 times daily      lisinopril (PRINIVIL;ZESTRIL) 5 MG tablet Take 1 tablet by mouth daily 30 tablet 5    Glucosamine-Chondroitin (OSTEO BI-FLEX REGULAR STRENGTH PO) Take by mouth      multivitamin (THERAGRAN) per tablet Take 1 tablet by mouth daily. No current facility-administered medications for this visit. Health Maintenance Due   Topic Date Due    Hepatitis C screen  1955    HIV screen  01/03/1972    Shingles Vaccine (1 of 2) 01/03/2007     He verbalized understanding of instructions and counseling. Return in about 6 months (around 11/20/2019). An  electronic signature was used to authenticate this note.     --Vu Anderson, APRN - CNP on 5/22/2019 at 1:39 PM

## 2019-05-20 NOTE — PATIENT INSTRUCTIONS
Patient Education        Eating Healthy Foods: Care Instructions  Your Care Instructions    Eating healthy foods can help lower your risk for disease. Healthy food gives you energy and keeps your heart strong, your brain active, your muscles working, and your bones strong. A healthy diet includes a variety of foods from the basic food groups: grains, vegetables, fruits, milk and milk products, and meat and beans. Some people may eat more of their favorite foods from only one food group and, as a result, miss getting the nutrients they need. So, it is important to pay attention not only to what you eat but also to what you are missing from your diet. You can eat a healthy, balanced diet by making a few small changes. Follow-up care is a key part of your treatment and safety. Be sure to make and go to all appointments, and call your doctor if you are having problems. It's also a good idea to know your test results and keep a list of the medicines you take. How can you care for yourself at home? Look at what you eat  · Keep a food diary for a week or two and record everything you eat or drink. Track the number of servings you eat from each food group. · For a balanced diet every day, eat a variety of:  ? 6 or more ounce-equivalents of grains, such as cereals, breads, crackers, rice, or pasta, every day. An ounce-equivalent is 1 slice of bread, 1 cup of ready-to-eat cereal, or ½ cup of cooked rice, cooked pasta, or cooked cereal.  ? 2½ cups of vegetables, especially:  § Dark-green vegetables such as broccoli and spinach. § Orange vegetables such as carrots and sweet potatoes. § Dry beans (such as nixon and kidney beans) and peas (such as lentils). ? 2 cups of fresh, frozen, or canned fruit. A small apple or 1 banana or orange equals 1 cup. ? 3 cups of nonfat or low-fat milk, yogurt, or other milk products. ? 5½ ounces of meat and beans, such as chicken, fish, lean meat, beans, nuts, and seeds.  One egg, 1 tablespoon of peanut butter, ½ ounce nuts or seeds, or ¼ cup of cooked beans equals 1 ounce of meat. · Learn how to read food labels for serving sizes and ingredients. Fast-food and convenience-food meals often contain few or no fruits or vegetables. Make sure you eat some fruits and vegetables to make the meal more nutritious. · Look at your food diary. For each food group, add up what you have eaten and then divide the total by the number of days. This will give you an idea of how much you are eating from each food group. See if you can find some ways to change your diet to make it more healthy. Start small  · Do not try to make dramatic changes to your diet all at once. You might feel that you are missing out on your favorite foods and then be more likely to fail. · Start slowly, and gradually change your habits. Try some of the following:  ? Use whole wheat bread instead of white bread. ? Use nonfat or low-fat milk instead of whole milk. ? Eat brown rice instead of white rice, and eat whole wheat pasta instead of white-flour pasta. ? Try low-fat cheeses and low-fat yogurt. ? Add more fruits and vegetables to meals and have them for snacks. ? Add lettuce, tomato, cucumber, and onion to sandwiches. ? Add fruit to yogurt and cereal.  Enjoy food  · You can still eat your favorite foods. You just may need to eat less of them. If your favorite foods are high in fat, salt, and sugar, limit how often you eat them, but do not cut them out entirely. · Eat a wide variety of foods. Make healthy choices when eating out  · The type of restaurant you choose can help you make healthy choices. Even fast-food chains are now offering more low-fat or healthier choices on the menu. · Choose smaller portions, or take half of your meal home. · When eating out, try:  ? A veggie pizza with a whole wheat crust or grilled chicken (instead of sausage or pepperoni).   ? Pasta with roasted vegetables, grilled chicken, or marinara sauce instead of cream sauce. ? A vegetable wrap or grilled chicken wrap. ? Broiled or poached food instead of fried or breaded items. Make healthy choices easy  · Buy packaged, prewashed, ready-to-eat fresh vegetables and fruits, such as baby carrots, salad mixes, and chopped or shredded broccoli and cauliflower. · Buy packaged, presliced fruits, such as melon or pineapple. · Choose 100% fruit or vegetable juice instead of soda. Limit juice intake to 4 to 6 oz (½ to ¾ cup) a day. · Blend low-fat yogurt, fruit juice, and canned or frozen fruit to make a smoothie for breakfast or a snack. Where can you learn more? Go to https://[employer]Ideagen. org and sign in to your [employer] account. Enter W635 in the [employer] box to learn more about \"Eating Healthy Foods: Care Instructions. \"     If you do not have an account, please click on the \"Sign Up Now\" link. Current as of: November 7, 2018  Content Version: 12.0  © 0253-3174 Healthwise, Incorporated. Care instructions adapted under license by [facility] ([facility]). If you have questions about a medical condition or this instruction, always ask your healthcare professional. [address] any warranty or liability for your use of this information.

## 2019-05-22 ASSESSMENT — ENCOUNTER SYMPTOMS
CHEST TIGHTNESS: 0
EYE PAIN: 0
EYE ITCHING: 0
NAUSEA: 0
RHINORRHEA: 0
EYE REDNESS: 0
SHORTNESS OF BREATH: 0
ABDOMINAL PAIN: 0
SORE THROAT: 0
ABDOMINAL DISTENTION: 0
DIARRHEA: 0
COUGH: 0
VOMITING: 0
EYE DISCHARGE: 0

## 2019-05-23 ENCOUNTER — HOSPITAL ENCOUNTER (OUTPATIENT)
Age: 64
Setting detail: OUTPATIENT SURGERY
Discharge: HOME | End: 2019-05-23
Attending: INTERNAL MEDICINE | Admitting: INTERNAL MEDICINE
Payer: COMMERCIAL

## 2019-05-23 ENCOUNTER — ANESTHESIA (OUTPATIENT)
Dept: ENDOSCOPY | Age: 64
End: 2019-05-23
Payer: COMMERCIAL

## 2019-05-23 VITALS — DIASTOLIC BLOOD PRESSURE: 108 MMHG | SYSTOLIC BLOOD PRESSURE: 162 MMHG | OXYGEN SATURATION: 93 %

## 2019-05-23 VITALS
OXYGEN SATURATION: 98 % | TEMPERATURE: 98.2 F | SYSTOLIC BLOOD PRESSURE: 121 MMHG | RESPIRATION RATE: 16 BRPM | DIASTOLIC BLOOD PRESSURE: 87 MMHG | HEIGHT: 75 IN | HEART RATE: 56 BPM | WEIGHT: 240 LBS | BODY MASS INDEX: 29.84 KG/M2

## 2019-05-23 LAB — MAGNESIUM: 2.2 MG/DL (ref 1.8–2.4)

## 2019-05-23 PROCEDURE — 2500000003 HC RX 250 WO HCPCS: Performed by: NURSE ANESTHETIST, CERTIFIED REGISTERED

## 2019-05-23 PROCEDURE — 7100000010 HC PHASE II RECOVERY - FIRST 15 MIN: Performed by: INTERNAL MEDICINE

## 2019-05-23 PROCEDURE — 6370000000 HC RX 637 (ALT 250 FOR IP): Performed by: INTERNAL MEDICINE

## 2019-05-23 PROCEDURE — 6360000002 HC RX W HCPCS: Performed by: NURSE ANESTHETIST, CERTIFIED REGISTERED

## 2019-05-23 PROCEDURE — 2580000003 HC RX 258: Performed by: FAMILY MEDICINE

## 2019-05-23 PROCEDURE — 2500000003 HC RX 250 WO HCPCS: Performed by: INTERNAL MEDICINE

## 2019-05-23 PROCEDURE — 83735 ASSAY OF MAGNESIUM: CPT

## 2019-05-23 PROCEDURE — 2709999900 HC NON-CHARGEABLE SUPPLY: Performed by: INTERNAL MEDICINE

## 2019-05-23 PROCEDURE — 3609012400 HC EGD TRANSORAL BIOPSY SINGLE/MULTIPLE: Performed by: INTERNAL MEDICINE

## 2019-05-23 PROCEDURE — 7100000011 HC PHASE II RECOVERY - ADDTL 15 MIN: Performed by: INTERNAL MEDICINE

## 2019-05-23 PROCEDURE — 3700000000 HC ANESTHESIA ATTENDED CARE: Performed by: INTERNAL MEDICINE

## 2019-05-23 PROCEDURE — 3700000001 HC ADD 15 MINUTES (ANESTHESIA): Performed by: INTERNAL MEDICINE

## 2019-05-23 PROCEDURE — 36415 COLL VENOUS BLD VENIPUNCTURE: CPT

## 2019-05-23 RX ORDER — SODIUM CHLORIDE 9 MG/ML
INJECTION, SOLUTION INTRAVENOUS CONTINUOUS
Status: DISCONTINUED | OUTPATIENT
Start: 2019-05-23 | End: 2019-05-23 | Stop reason: HOSPADM

## 2019-05-23 RX ORDER — LIDOCAINE HYDROCHLORIDE 20 MG/ML
INJECTION, SOLUTION INFILTRATION; PERINEURAL PRN
Status: DISCONTINUED | OUTPATIENT
Start: 2019-05-23 | End: 2019-05-23 | Stop reason: SDUPTHER

## 2019-05-23 RX ORDER — SODIUM CHLORIDE 0.9 % (FLUSH) 0.9 %
10 SYRINGE (ML) INJECTION EVERY 12 HOURS SCHEDULED
Status: DISCONTINUED | OUTPATIENT
Start: 2019-05-23 | End: 2019-05-23 | Stop reason: HOSPADM

## 2019-05-23 RX ORDER — SODIUM CHLORIDE 0.9 % (FLUSH) 0.9 %
10 SYRINGE (ML) INJECTION PRN
Status: DISCONTINUED | OUTPATIENT
Start: 2019-05-23 | End: 2019-05-23 | Stop reason: HOSPADM

## 2019-05-23 RX ORDER — PROPOFOL 10 MG/ML
INJECTION, EMULSION INTRAVENOUS PRN
Status: DISCONTINUED | OUTPATIENT
Start: 2019-05-23 | End: 2019-05-23 | Stop reason: SDUPTHER

## 2019-05-23 RX ADMIN — PROPOFOL 50 MG: 10 INJECTION, EMULSION INTRAVENOUS at 09:56

## 2019-05-23 RX ADMIN — LIDOCAINE HYDROCHLORIDE 60 MG: 20 INJECTION, SOLUTION INFILTRATION; PERINEURAL at 09:48

## 2019-05-23 RX ADMIN — PROPOFOL 50 MG: 10 INJECTION, EMULSION INTRAVENOUS at 09:47

## 2019-05-23 RX ADMIN — PROPOFOL 50 MG: 10 INJECTION, EMULSION INTRAVENOUS at 09:49

## 2019-05-23 RX ADMIN — SODIUM CHLORIDE: 9 INJECTION, SOLUTION INTRAVENOUS at 09:34

## 2019-05-23 RX ADMIN — PROPOFOL 50 MG: 10 INJECTION, EMULSION INTRAVENOUS at 09:52

## 2019-05-23 ASSESSMENT — PAIN SCALES - GENERAL
PAINLEVEL_OUTOF10: 0

## 2019-05-23 ASSESSMENT — PAIN - FUNCTIONAL ASSESSMENT: PAIN_FUNCTIONAL_ASSESSMENT: 0-10

## 2019-05-23 ASSESSMENT — ENCOUNTER SYMPTOMS: SHORTNESS OF BREATH: 0

## 2019-05-23 NOTE — ANESTHESIA POSTPROCEDURE EVALUATION
Department of Anesthesiology  Postprocedure Note    Patient: Chirag Lau  MRN: [id number]  YOB: 1955  Date of evaluation: 5/23/2019  Time:  10:27 AM     Procedure Summary     Date:  05/23/19 Room / Location:  [facility] ENDO 02 / [facility] ENDOSCOPY    Anesthesia Start:  0526 Anesthesia Stop:  1009    Procedure:  EGD BIOPSY (N/A Abdomen) Diagnosis:  (R13.10 - DYSPHAGIA)    Surgeon:  Rishabh Kennedy MD Responsible Provider:  Sun Woods MD    Anesthesia Type:  MAC ASA Status:  2          Anesthesia Type: MAC    Cuauhtemoc Phase I: Cuauhtemoc Score: 10    Cuauhtemoc Phase II:      Last vitals: Reviewed and per EMR flowsheets.        Anesthesia Post Evaluation    Patient location during evaluation: PACU  Patient participation: complete - patient participated  Level of consciousness: awake and alert  Airway patency: patent  Nausea & Vomiting: no nausea and no vomiting  Complications: no  Cardiovascular status: hemodynamically stable  Respiratory status: acceptable  Hydration status: stable

## 2019-05-23 NOTE — ANESTHESIA PRE PROCEDURE
Department of Anesthesiology  Preprocedure Note       Name:  Rojelio Lind   Age:  60 y.o.  :  1955                                          MRN:  [id number]         Date:  2019      Surgeon: Otoniel Townsend):  Raymond Le MD    Procedure: EGD DILATION BALLOON (N/A Abdomen)    Medications prior to admission:   Prior to Admission medications    Medication Sig Start Date End Date Taking? Authorizing Provider   brimonidine (ALPHAGAN P) 0.1 % SOLN 1 drop 2 times daily   Yes Historical Provider, MD   lisinopril (PRINIVIL;ZESTRIL) 5 MG tablet Take 1 tablet by mouth daily 19  Yes Vu Memory, APRN - CNP   Glucosamine-Chondroitin (OSTEO BI-FLEX REGULAR STRENGTH PO) Take by mouth    Historical Provider, MD   multivitamin [facility]) per tablet Take 1 tablet by mouth daily. Historical Provider, MD       Current medications:    Current Facility-Administered Medications   Medication Dose Route Frequency Provider Last Rate Last Dose    0.9 % sodium chloride infusion   Intravenous Continuous Ada Holm MD        sodium chloride flush 0.9 % injection 10 mL  10 mL Intravenous 2 times per day Ada Holm MD        sodium chloride flush 0.9 % injection 10 mL  10 mL Intravenous PRN Ada Holm MD           Allergies:     Allergies   Allergen Reactions    Deconamine [Chlorpheniramine-Pseudoeph] Other (See Comments)     Hallucinations    Dorzolamide      Increased eye pressure    Viagra [Sildenafil Citrate] Other (See Comments)     Temporary blindness       Problem List:    Patient Active Problem List   Diagnosis Code    Allergic rhinitis J30.9    Erectile dysfunction N52.9    Hyperlipidemia E78.5    Elevated blood pressure reading R03.0    Essential hypertension I10    Class 1 obesity due to excess calories without serious comorbidity with body mass index (BMI) of 33.0 to 33.9 in adult E66.09, Z47.30       Past Medical History:        Diagnosis Date    Erectile dysfunction     Hyperlipidemia     Pneumonia 01/2019       Past Surgical History:        Procedure Laterality Date    COLONOSCOPY  9/2012    RETINAL DETACHMENT SURGERY      Bilateral- (L) Blind-Cosmetic Contact; 12 surgeries on left; 8 on right       Social History:    Social History     Tobacco Use    Smoking status: Never Smoker    Smokeless tobacco: Never Used   Substance Use Topics    Alcohol use: Yes     Comment: social                                Counseling given: Not Answered      Vital Signs (Current):   Vitals:    05/23/19 0910   BP: (!) 137/91   Pulse: 53   Resp: 14   Temp: 97 °F (36.1 °C)   TempSrc: Tympanic   SpO2: 98%   Weight: 240 lb (108.9 kg)   Height: 6' 3\" (1.905 m)                                              BP Readings from Last 3 Encounters:   05/23/19 (!) 137/91   05/20/19 (!) 135/93   07/24/18 108/82       NPO Status:                                                                                 BMI:   Wt Readings from Last 3 Encounters:   05/23/19 240 lb (108.9 kg)   05/20/19 252 lb 14.4 oz (114.7 kg)   07/24/18 248 lb (112.5 kg)     Body mass index is 30 kg/m². CBC:   Lab Results   Component Value Date    WBC 7.0 10/04/2018    RBC 4.40 10/04/2018    HGB 13.4 10/04/2018    HCT 40.5 10/04/2018    MCV 92.1 10/04/2018    RDW 14.2 10/04/2018     10/04/2018       CMP:   Lab Results   Component Value Date     10/04/2018    K 4.2 10/04/2018     10/04/2018    CO2 27 10/04/2018    BUN 15 10/04/2018    CREATININE 0.9 09/10/2012    GFRAA > 60 10/04/2018    GFRAA >60 09/10/2012    AGRATIO 0.8 10/04/2018    LABGLOM > 60 10/04/2018    GLUCOSE 101 10/04/2018    PROT 7.2 09/10/2012    CALCIUM 8.2 10/04/2018    BILITOT 0.6 10/04/2018    ALKPHOS 63 10/04/2018    AST 23 10/04/2018    ALT 24 10/04/2018       POC Tests: No results for input(s): POCGLU, POCNA, POCK, POCCL, POCBUN, POCHEMO, POCHCT in the last 72 hours.     Coags:   Lab Results   Component Value Date    PROTIME 11.5 09/10/2012    INR

## 2019-05-23 NOTE — PROGRESS NOTES
Name:  Ivelisse Luna  Age:  60 y.o.  CSN:  [id number]            Past Medical History:        Diagnosis Date    Erectile dysfunction     Hyperlipidemia     Pneumonia 01/2019       Past Surgical History:      Procedure Laterality Date    COLONOSCOPY  9/2012    RETINAL DETACHMENT SURGERY      Bilateral- (L) Blind-Cosmetic Contact; 12 surgeries on left; 8 on right       Medications Prior to Admission:  Medications Prior to Admission: brimonidine (ALPHAGAN P) 0.1 % SOLN, 1 drop 2 times daily  lisinopril (PRINIVIL;ZESTRIL) 5 MG tablet, Take 1 tablet by mouth daily  Glucosamine-Chondroitin (OSTEO BI-FLEX REGULAR STRENGTH PO), Take by mouth  multivitamin (THERAGRAN) per tablet, Take 1 tablet by mouth daily.       Allergies:  Deconamine [chlorpheniramine-pseudoeph]; Dorzolamide; and Viagra [sildenafil citrate]    Social History:  Social History     Socioeconomic History    Marital status:      Spouse name: Not on file    Number of children: Not on file    Years of education: Not on file    Highest education level: Not on file   Occupational History    Not on file   Social Needs    Financial resource strain: Not on file    Food insecurity:     Worry: Not on file     Inability: Not on file    Transportation needs:     Medical: Not on file     Non-medical: Not on file   Tobacco Use    Smoking status: Never Smoker    Smokeless tobacco: Never Used   Substance and Sexual Activity    Alcohol use: Yes     Comment: social    Drug use: No    Sexual activity: Not on file   Lifestyle    Physical activity:     Days per week: Not on file     Minutes per session: Not on file    Stress: Not on file   Relationships    Social connections:     Talks on phone: Not on file     Gets together: Not on file     Attends Zoroastrian service: Not on file     Active member of club or organization: Not on file     Attends meetings of clubs or organizations: Not on file     Relationship status: Not on file    Intimate partner violence:     Fear of current or ex partner: Not on file     Emotionally abused: Not on file     Physically abused: Not on file     Forced sexual activity: Not on file   Other Topics Concern    Not on file   Social History Narrative    Not on file       Family History:      Problem Relation Age of Onset    Heart Disease Father     Kidney Disease Father     Coronary Art Dis Father     Heart Attack Father     Parkinsonism Brother     Cancer Sister         ovarian    Stroke Sister 64    Obesity Sister     Coronary Art Dis Mother     Heart Attack Mother     Prostate Cancer Brother 62       Vital Signs:  Vitals:    05/23/19 0910   BP: (!) 137/91   Pulse: 53   Resp: 14   Temp: 97 °F (36.1 °C)   SpO2: 98%

## 2019-06-03 ENCOUNTER — TELEPHONE (OUTPATIENT)
Dept: FAMILY MEDICINE CLINIC | Age: 64
End: 2019-06-03

## 2019-11-18 ENCOUNTER — OFFICE VISIT (OUTPATIENT)
Dept: PRIMARY CARE CLINIC | Age: 64
End: 2019-11-18
Payer: COMMERCIAL

## 2019-11-18 VITALS
BODY MASS INDEX: 30.54 KG/M2 | DIASTOLIC BLOOD PRESSURE: 80 MMHG | HEART RATE: 65 BPM | WEIGHT: 244.3 LBS | TEMPERATURE: 96.5 F | SYSTOLIC BLOOD PRESSURE: 110 MMHG | OXYGEN SATURATION: 98 %

## 2019-11-18 DIAGNOSIS — Z00.00 HEALTHCARE MAINTENANCE: ICD-10-CM

## 2019-11-18 DIAGNOSIS — R73.03 PREDIABETES: ICD-10-CM

## 2019-11-18 DIAGNOSIS — Z12.5 SCREENING FOR PROSTATE CANCER: ICD-10-CM

## 2019-11-18 DIAGNOSIS — E78.2 MIXED HYPERLIPIDEMIA: ICD-10-CM

## 2019-11-18 DIAGNOSIS — K12.2 UVULITIS: Primary | ICD-10-CM

## 2019-11-18 DIAGNOSIS — I10 ESSENTIAL HYPERTENSION: ICD-10-CM

## 2019-11-18 DIAGNOSIS — J00 NASOPHARYNGITIS: ICD-10-CM

## 2019-11-18 PROCEDURE — 99213 OFFICE O/P EST LOW 20 MIN: CPT | Performed by: NURSE PRACTITIONER

## 2019-11-18 RX ORDER — AMOXICILLIN 500 MG/1
500 CAPSULE ORAL 2 TIMES DAILY
Qty: 14 CAPSULE | Refills: 0 | Status: SHIPPED | OUTPATIENT
Start: 2019-11-18 | End: 2019-11-25

## 2019-11-18 ASSESSMENT — ENCOUNTER SYMPTOMS
RHINORRHEA: 1
VOMITING: 0
ABDOMINAL PAIN: 0
DIARRHEA: 0
EYE DISCHARGE: 0
SHORTNESS OF BREATH: 0
NAUSEA: 0
SORE THROAT: 1
COUGH: 0
EYE REDNESS: 0
EYE PAIN: 0
EYE ITCHING: 0

## 2019-12-02 ENCOUNTER — OFFICE VISIT (OUTPATIENT)
Dept: PRIMARY CARE CLINIC | Age: 64
End: 2019-12-02
Payer: COMMERCIAL

## 2019-12-02 VITALS
OXYGEN SATURATION: 97 % | SYSTOLIC BLOOD PRESSURE: 122 MMHG | WEIGHT: 243.5 LBS | HEART RATE: 63 BPM | BODY MASS INDEX: 30.44 KG/M2 | DIASTOLIC BLOOD PRESSURE: 84 MMHG

## 2019-12-02 DIAGNOSIS — Z23 NEED FOR VACCINATION: ICD-10-CM

## 2019-12-02 DIAGNOSIS — H40.89 OTHER GLAUCOMA OF RIGHT EYE: ICD-10-CM

## 2019-12-02 DIAGNOSIS — E03.9 HYPOTHYROIDISM, UNSPECIFIED TYPE: ICD-10-CM

## 2019-12-02 DIAGNOSIS — Z00.00 ANNUAL PHYSICAL EXAM: Primary | ICD-10-CM

## 2019-12-02 PROCEDURE — 99396 PREV VISIT EST AGE 40-64: CPT | Performed by: NURSE PRACTITIONER

## 2019-12-02 PROCEDURE — 90686 IIV4 VACC NO PRSV 0.5 ML IM: CPT | Performed by: NURSE PRACTITIONER

## 2019-12-02 PROCEDURE — 90471 IMMUNIZATION ADMIN: CPT | Performed by: NURSE PRACTITIONER

## 2019-12-02 RX ORDER — LEVOTHYROXINE SODIUM 0.05 MG/1
50 TABLET ORAL DAILY
Qty: 30 TABLET | Refills: 1 | Status: SHIPPED | OUTPATIENT
Start: 2019-12-02 | End: 2020-02-03 | Stop reason: DRUGHIGH

## 2020-01-08 ENCOUNTER — TELEPHONE (OUTPATIENT)
Dept: PRIMARY CARE CLINIC | Age: 65
End: 2020-01-08

## 2020-03-11 ENCOUNTER — TELEPHONE (OUTPATIENT)
Dept: PRIMARY CARE CLINIC | Age: 65
End: 2020-03-11

## 2020-03-11 ENCOUNTER — OFFICE VISIT (OUTPATIENT)
Dept: PRIMARY CARE CLINIC | Age: 65
End: 2020-03-11
Payer: COMMERCIAL

## 2020-03-11 VITALS
SYSTOLIC BLOOD PRESSURE: 122 MMHG | DIASTOLIC BLOOD PRESSURE: 80 MMHG | HEART RATE: 73 BPM | BODY MASS INDEX: 29.34 KG/M2 | TEMPERATURE: 97.5 F | WEIGHT: 234.7 LBS | OXYGEN SATURATION: 96 %

## 2020-03-11 LAB
INFLUENZA A ANTIGEN, POC: NEGATIVE
INFLUENZA B ANTIGEN, POC: NEGATIVE

## 2020-03-11 PROCEDURE — 87804 INFLUENZA ASSAY W/OPTIC: CPT | Performed by: NURSE PRACTITIONER

## 2020-03-11 PROCEDURE — 99213 OFFICE O/P EST LOW 20 MIN: CPT | Performed by: NURSE PRACTITIONER

## 2020-03-11 RX ORDER — ONDANSETRON 4 MG/1
4 TABLET, FILM COATED ORAL 3 TIMES DAILY PRN
Qty: 15 TABLET | Refills: 0 | Status: SHIPPED | OUTPATIENT
Start: 2020-03-11 | End: 2021-06-14

## 2020-03-11 ASSESSMENT — ENCOUNTER SYMPTOMS
EYE ITCHING: 0
SHORTNESS OF BREATH: 0
RHINORRHEA: 1
EYE DISCHARGE: 0
SORE THROAT: 0
ABDOMINAL PAIN: 1
NAUSEA: 1
EYE PAIN: 0
COUGH: 0
EYE REDNESS: 0
VOMITING: 0
DIARRHEA: 1

## 2020-03-11 NOTE — TELEPHONE ENCOUNTER
Patient returned phone call to office. Per patient started with runny nose, chills, muscle aches and stomach discomfort. Denies any recent sick contacts. Did receive flu shot for the season. Made an appointment for today for evaluation.

## 2020-03-11 NOTE — PROGRESS NOTES
Positive for myalgias. Past medical, surgical, family and social history were reviewed and updated with the patient. Objective:    /80 (Site: Left Upper Arm, Position: Sitting, Cuff Size: Large Adult)   Pulse 73   Temp 97.5 °F (36.4 °C) (Tympanic)   Wt 234 lb 11.2 oz (106.5 kg)   SpO2 96%   BMI 29.34 kg/m²   Weight: 234 lb 11.2 oz (106.5 kg)     BP Readings from Last 3 Encounters:   03/11/20 122/80   12/02/19 122/84   11/18/19 110/80     Wt Readings from Last 3 Encounters:   03/11/20 234 lb 11.2 oz (106.5 kg)   12/02/19 243 lb 8 oz (110.5 kg)   11/18/19 244 lb 4.8 oz (110.8 kg)     Physical Exam  Constitutional:       General: He is not in acute distress. Appearance: He is well-developed. HENT:      Head: Normocephalic and atraumatic. Cardiovascular:      Rate and Rhythm: Normal rate and regular rhythm. Heart sounds: Normal heart sounds, S1 normal and S2 normal.   Pulmonary:      Effort: Pulmonary effort is normal. No respiratory distress. Breath sounds: Normal breath sounds. Abdominal:      General: Abdomen is flat. Bowel sounds are increased. Palpations: Abdomen is soft. Tenderness: There is no abdominal tenderness. There is no guarding or rebound. Skin:     General: Skin is warm and dry. Neurological:      Mental Status: He is alert and oriented to person, place, and time. Psychiatric:         Thought Content: Thought content normal.         Judgment: Judgment normal.       Results for POC orders placed in visit on 03/11/20   POCT Influenza A/B Antigen   Result Value Ref Range    Inflenza A Ag Negative     Influenza B Ag Negative      Assessment/Plan    1. Gastroenteritis  Most likely viral in nature. Start on Zofran for nausea and monitor symptoms over the next 24-72 hours. Follow up with office if symptoms worsen or do not improve. - ondansetron (ZOFRAN) 4 MG tablet;  Take 1 tablet by mouth 3 times daily as needed for Nausea or Vomiting  Dispense: 15

## 2020-05-16 ENCOUNTER — PATIENT MESSAGE (OUTPATIENT)
Dept: PRIMARY CARE CLINIC | Age: 65
End: 2020-05-16

## 2020-05-18 ENCOUNTER — PATIENT MESSAGE (OUTPATIENT)
Dept: PRIMARY CARE CLINIC | Age: 65
End: 2020-05-18

## 2020-05-18 NOTE — TELEPHONE ENCOUNTER
From: Rosales Donovan  To: JOSSE Ramos - CNP  Sent: 5/16/2020 5:31 PM EDT  Subject: Visit Follow-Up Question    Sandee Bernardo,    Let me know when you feel it is ok for me to have the thyroid related blood tests done. I currently have about 25 of the Levothyroxine Sodium 75 MCG tablets left. Thanks!   Greg Brown

## 2020-05-19 ENCOUNTER — PATIENT MESSAGE (OUTPATIENT)
Dept: PRIMARY CARE CLINIC | Age: 65
End: 2020-05-19

## 2020-06-01 ENCOUNTER — PATIENT MESSAGE (OUTPATIENT)
Dept: PRIMARY CARE CLINIC | Age: 65
End: 2020-06-01

## 2020-06-04 ENCOUNTER — TELEPHONE (OUTPATIENT)
Dept: FAMILY MEDICINE CLINIC | Age: 65
End: 2020-06-04

## 2020-06-04 RX ORDER — LEVOTHYROXINE SODIUM 0.1 MG/1
100 TABLET ORAL DAILY
Qty: 30 TABLET | Refills: 1 | Status: SHIPPED | OUTPATIENT
Start: 2020-06-04 | End: 2020-07-22 | Stop reason: SDUPTHER

## 2020-06-05 NOTE — TELEPHONE ENCOUNTER
From: Derrell Bush  To: Jihan DannyJOSSE king - CNP  Sent: 6/1/2020 6:33 PM EDT  Subject: Visit Follow-Up Question    please send to [email or web address] Leung      ----- Message -----   From:JOSSE Nation - ANGELIQUE   Sent:6/1/2020 1:01 PM EDT   To:Zacarias Berrios   Subject:RE: Visit Follow-Up Question    Janeth Whitmore,    They typically only keep for 1-2 days so they may have already discarded it. What email address would you like the orders sent to? Thanks,  JOSSE Anderson      ----- Message -----   Syed Hackett   Sent:6/1/2020 11:33 AM EDT   To:JOSSE Nation - CNP   Subject:RE: Visit Follow-Up Question    Coreen Chery. [place] [facility] [place] and they could not find the orders. They tried calling your office but the call volume \"was significant\" so I was unable to have the test done this morning. Their fax number is [phone]. Please also email me a copy of the order so I can take it with me. Janeth Whitmore      ----- Message -----   From:HARRY GOLD   Sent:5/29/2020 1:19 PM EDT   To:Zacarias Berrios   Subject:RE: Visit Follow-Up Question    No, it is fine to go now. I will fax the orders. Have a great weekend. Max Levine      ----- Message -----   Syed Hackett   Sent:5/29/2020 11:21 AM EDT   To:JOSSE Nation - CNP   Subject:RE: Visit Follow-Up Question    Max Levine,    I haven't received them in the mail yet. Please go ahead and fax the lab orders [place] [facility] I will plan on going in for the tests on Monday unless you suggest that I wait longer. Thanks for your help,  Janeth Whitmore      ----- Message -----   From:HARRY GOLD   Sent:5/29/2020 11:11 AM EDT   To:Zacarias Berrios   Subject:RE: Visit Follow-Up Question    Kierra Richard,    We did mail lab orders to your home. We can mail them again or we can fax to [facility] when you are ready to go have labs drawn. Please let me know.     Thanks!!  Max Levine      ----- Message -----   From:Zacarias Berrios   Sent:5/28/2020 8:07 AM EDT    To:Betzy ABARCA Shelva Chalo, APRN - CNP   Subject:RE: Visit Follow-Up Question    Zeinab Most,    I see you ordered the two Thyroid tests for me. I have not received a paper copy in the [facility] mail. Did the order go to [facility] directly and I don't need a paper copy to take to them? Sorry for the confusion on my end. Thanks! Aleksander Otero      ----- Message -----   From:JOSSE Rivera CNP   Sent:2020 9:28 AM EDT   To:Zacarias Parra   Subject:RE: Visit Follow-Up Question    Thong Keisha good! I will get that in the mail to you today!!    Enjoy your holiday weekend!! Lets hope the rain is past us! Thanks,  JOSSE Ronquillo      ----- Message -----   Marquis Mike   Sent:2020 5:45 PM EDT   To:JOSSE Rivera CNP   Subject:Visit Follow-Up Question    Khoa Limon mail is fine. Thanks! Aleksander Otero      ----- Message -----   From:JOSSE Rivera CNP   Sent:2020 8:40 AM EDT   To:Zacarias Berrios   Subject:RE: Visit Follow-Up Question    That is fine. How would you like for me to get you the orders - email or mail? Thanks,   JOSSE Ronquillo      ----- Message -----   Marquis Mike   Sent:2020 6:24 PM EDT   To:JOSSE Rivera CNP   Subject:Visit Follow-Up Question    Zeinab Most,    I would like to go to [facility] on [address] (same place that did the other tests). Thanks! Aleksander Otero      ----- Message -----   From:JOSSE Rivera CNP   Sent:2020 8:23 AM EDT   To:Zacarias Parra   Subject:RE: Visit Follow-Up Question    Winston Orantes are all still open. You orders are  so I will re-enter. Where do you want to go for your lab work? If it is a non [facility] facility I can either mail the orders to your house or email them to you. Last TSH we have is from 2020 and it was 22 - range is between about 0.25-4.     Thanks,  JOSSE Ronquillo      ----- Message -----   Marquis Mike   Sent:2020 5:31 PM EDT   To:JOSSE Rivera - CNP   Subject:Visit Follow-Up

## 2020-07-22 RX ORDER — LEVOTHYROXINE SODIUM 0.1 MG/1
100 TABLET ORAL DAILY
Qty: 30 TABLET | Refills: 3 | Status: SHIPPED | OUTPATIENT
Start: 2020-07-22 | End: 2020-11-11

## 2021-02-07 DIAGNOSIS — E03.9 HYPOTHYROIDISM, UNSPECIFIED TYPE: ICD-10-CM

## 2021-02-08 RX ORDER — LEVOTHYROXINE SODIUM 0.1 MG/1
TABLET ORAL
Qty: 90 TABLET | Refills: 0 | Status: SHIPPED | OUTPATIENT
Start: 2021-02-08 | End: 2021-05-06

## 2021-02-08 NOTE — TELEPHONE ENCOUNTER
Medication:   Requested Prescriptions     Pending Prescriptions Disp Refills    levothyroxine (SYNTHROID) 100 MCG tablet [Pharmacy Med Name: Levothyroxine Sodium Oral Tablet 100 MCG] 90 tablet 0     Sig: TAKE 1 TABLET BY MOUTH EVERY DAY      Last Filled:      Patient Phone Number: [phone] (home)     Last appt: 12/2/2019  Next appt: Visit date not found    Last OARRS: No flowsheet data found.   PDMP Monitoring:    Last PDMP Lyssa Wood as Reviewed [facility]):  Review User Review Instant Review Result          Preferred Pharmacy:   [id number] [address] [facility] [facility].  [address] [place] [address]  Phone: [phone] Fax: [phone]

## 2021-05-05 DIAGNOSIS — E03.9 HYPOTHYROIDISM, UNSPECIFIED TYPE: ICD-10-CM

## 2021-05-06 RX ORDER — LEVOTHYROXINE SODIUM 0.1 MG/1
TABLET ORAL
Qty: 30 TABLET | Refills: 0 | Status: SHIPPED | OUTPATIENT
Start: 2021-05-06 | End: 2021-06-11

## 2021-06-07 DIAGNOSIS — E03.9 HYPOTHYROIDISM, UNSPECIFIED TYPE: ICD-10-CM

## 2021-06-07 RX ORDER — LEVOTHYROXINE SODIUM 0.1 MG/1
TABLET ORAL
Qty: 30 TABLET | Refills: 0 | OUTPATIENT
Start: 2021-06-07

## 2021-06-10 DIAGNOSIS — E03.9 HYPOTHYROIDISM, UNSPECIFIED TYPE: ICD-10-CM

## 2021-06-11 RX ORDER — LEVOTHYROXINE SODIUM 0.1 MG/1
TABLET ORAL
Qty: 30 TABLET | Refills: 0 | Status: SHIPPED | OUTPATIENT
Start: 2021-06-11 | End: 2021-06-18 | Stop reason: SDUPTHER

## 2021-06-14 ENCOUNTER — OFFICE VISIT (OUTPATIENT)
Dept: FAMILY MEDICINE CLINIC | Age: 66
End: 2021-06-14
Payer: COMMERCIAL

## 2021-06-14 VITALS
WEIGHT: 230.8 LBS | SYSTOLIC BLOOD PRESSURE: 110 MMHG | HEIGHT: 74 IN | OXYGEN SATURATION: 98 % | TEMPERATURE: 97.7 F | HEART RATE: 74 BPM | DIASTOLIC BLOOD PRESSURE: 80 MMHG | BODY MASS INDEX: 29.62 KG/M2

## 2021-06-14 DIAGNOSIS — Z13.220 SCREENING FOR HYPERLIPIDEMIA: ICD-10-CM

## 2021-06-14 DIAGNOSIS — E03.9 HYPOTHYROIDISM, UNSPECIFIED TYPE: ICD-10-CM

## 2021-06-14 DIAGNOSIS — R73.03 PREDIABETES: ICD-10-CM

## 2021-06-14 DIAGNOSIS — R13.10 DYSPHAGIA, UNSPECIFIED TYPE: ICD-10-CM

## 2021-06-14 DIAGNOSIS — Z12.5 SCREENING FOR MALIGNANT NEOPLASM OF PROSTATE: ICD-10-CM

## 2021-06-14 DIAGNOSIS — Z00.00 ANNUAL PHYSICAL EXAM: Primary | ICD-10-CM

## 2021-06-14 PROCEDURE — 99396 PREV VISIT EST AGE 40-64: CPT | Performed by: NURSE PRACTITIONER

## 2021-06-14 ASSESSMENT — PATIENT HEALTH QUESTIONNAIRE - PHQ9
SUM OF ALL RESPONSES TO PHQ9 QUESTIONS 1 & 2: 0
SUM OF ALL RESPONSES TO PHQ QUESTIONS 1-9: 0
SUM OF ALL RESPONSES TO PHQ QUESTIONS 1-9: 0
2. FEELING DOWN, DEPRESSED OR HOPELESS: 0
SUM OF ALL RESPONSES TO PHQ QUESTIONS 1-9: 0
1. LITTLE INTEREST OR PLEASURE IN DOING THINGS: 0

## 2021-06-14 NOTE — PATIENT INSTRUCTIONS
Patient Education        Hypothyroidism: Care Instructions  Your Care Instructions     When you have hypothyroidism, your body doesn't make enough thyroid hormone. This hormone helps your body use energy. If your thyroid level is low, you may feel tired, be constipated, have an increase in your blood pressure, or have dry skin or memory problems. You may also get cold easily, even when it is warm. Women with low thyroid levels may have heavy menstrual periods. A blood test to find your thyroid-stimulating hormone (TSH) level is used to check for hypothyroidism. A high TSH level may mean that you have it. The treatment for hypothyroidism is thyroid hormone pills. You should start to feel better in 1 to 2 weeks. Most people need treatment for the rest of their lives. You will need regular visits with your doctor to make sure you are doing well and that you have the right dose of medicine. Follow-up care is a key part of your treatment and safety. Be sure to make and go to all appointments, and call your doctor if you are having problems. It's also a good idea to know your test results and keep a list of the medicines you take. How can you care for yourself at home? · Take your thyroid hormone medicine exactly as prescribed. Call your doctor if you think you are having a problem with your medicine. Most people do not have side effects if they take the right amount of medicine regularly. ? Take the medicine 30 minutes before breakfast, and do not take it with calcium, vitamins, or iron. ? Do not take extra doses of your thyroid medicine. It will not help you get better any faster, and it may cause side effects. ? If you forget to take a dose, do NOT take a double dose of medicine. Take your usual dose the next day. · Tell your doctor about all prescription, herbal, or over-the-counter products you take. · Take care of yourself. Eat a healthy diet, get enough sleep, and get regular exercise.   When should you call for help? Call 911 anytime you think you may need emergency care. For example, call if:    · You passed out (lost consciousness).     · You have severe trouble breathing.     · You have a very slow heartbeat (less than 60 beats a minute).     · You have a low body temperature (95°F or below). Call your doctor now or seek immediate medical care if:    · You feel tired, sluggish, or weak.     · You have trouble remembering things or concentrating.     · You do not begin to feel better 2 weeks after starting your medicine. Watch closely for changes in your health, and be sure to contact your doctor if you have any problems. Where can you learn more? Go to https://EnvoimoinscherpeRatify.20/20 [employer] org and sign in to your Composeright account. Enter R496 in the CompassMD box to learn more about \"Hypothyroidism: Care Instructions. \"     If you do not have an account, please click on the \"Sign Up Now\" link. Current as of: March 31, 2020               Content Version: 12.8  © 8765-5089 [employer]. Care instructions adapted under license by [facility] ([facility]). If you have questions about a medical condition or this instruction, always ask your healthcare professional. [place] [address] any warranty or liability for your use of this information. Patient Education        Well Visit, Men 48 to 72: Care Instructions  Overview     Well visits can help you stay healthy. Your doctor has checked your overall health and may have suggested ways to take good care of yourself. Your doctor also may have recommended tests. At home, you can help prevent illness with healthy eating, regular exercise, and other steps. Follow-up care is a key part of your treatment and safety. Be sure to make and go to all appointments, and call your doctor if you are having problems. It's also a good idea to know your test results and keep a list of the medicines you take.   How can you care for yourself at home? · Get screening tests that you and your doctor decide on. Screening helps find diseases before any symptoms appear. · Eat healthy foods. Choose fruits, vegetables, whole grains, protein, and low-fat dairy foods. Limit fat, especially saturated fat. Reduce salt in your diet. · Limit alcohol. Have no more than 2 drinks a day or 14 drinks a week. · Get at least 30 minutes of exercise on most days of the week. Walking is a good choice. You also may want to do other activities, such as running, swimming, cycling, or playing tennis or team sports. · Reach and stay at a healthy weight. This will lower your risk for many problems, such as obesity, diabetes, heart disease, and high blood pressure. · Do not smoke. Smoking can make health problems worse. If you need help quitting, talk to your doctor about stop-smoking programs and medicines. These can increase your chances of quitting for good. · Care for your mental health. It is easy to get weighed down by worry and stress. Learn strategies to manage stress, like deep breathing and mindfulness, and stay connected with your family and community. If you find you often feel sad or hopeless, talk with your doctor. Treatment can help. · Talk to your doctor about whether you have any risk factors for sexually transmitted infections (STIs). You can help prevent STIs if you wait to have sex with a new partner (or partners) until you've each been tested for STIs. It also helps if you use condoms (male or female condoms) and if you limit your sex partners to one person who only has sex with you. Vaccines are available for some STIs. · If it's important to you to prevent pregnancy with your partner, talk with your doctor about birth control options that might be best for you. · If you think you may have a problem with alcohol or drug use, talk to your doctor.  This includes prescription medicines (such as amphetamines and opioids) and illegal drugs (such as cocaine and methamphetamine). Your doctor can help you figure out what type of treatment is best for you. · Protect your skin from too much sun. When you're outdoors from 10 a.m. to 4 p.m., stay in the shade or cover up with clothing and a hat with a wide brim. Wear sunglasses that block UV rays. Even when it's cloudy, put broad-spectrum sunscreen (SPF 30 or higher) on any exposed skin. · See a dentist one or two times a year for checkups and to have your teeth cleaned. · Wear a seat belt in the car. When should you call for help? Watch closely for changes in your health, and be sure to contact your doctor if you have any problems or symptoms that concern you. Where can you learn more? Go to https://DaoliCloudpeabioduneweb.Peakos. org and sign in to your Cardiostrong account. Enter M884 in the iList box to learn more about \"Well Visit, Men 48 to 72: Care Instructions. \"     If you do not have an account, please click on the \"Sign Up Now\" link. Current as of: May 27, 2020               Content Version: 12.8  © 1712-4722 Healthwise, Incorporated. Care instructions adapted under license by [facility] ([facility]). If you have questions about a medical condition or this instruction, always ask your healthcare professional. [address] any warranty or liability for your use of this information.

## 2021-06-14 NOTE — PROGRESS NOTES
History and Physical      Jennifer Boswell  YOB: 1955    Date of Service:  6/14/2021    Chief Complaint:   Jennifer Boswell is a 61 y.o. male who presents for complete physical examination. HPI: Presents to clinic today for annual physical exam and follow up on chronic health conditions. Hypothyroidism  Last thyroid studies normal - July 2020 - stable at 100mcg dosage. Compliant with medications. Denies any side effects. Has concerns about difficulty swallowing solids - reports has noticed 6-7 months has progressively been worsening - at times will notice approximately once weekly, but hasn't noticed over the past 4 weeks. Mostly with lunch meal - will have to regurgitate food - unable to pass. Approximately 2 years prior - had to have an esophageal dilation, hiatal hernia and ulcerations in esophagus - did 6 month follow up after being on Pepcid and everything was fine - Dr. Danielito Solis. Diet: Good, Fruits and vegetables daily. Does eat fair amount of sugar daily. Limits junk. Exercise: Cardio daily -  days per week for one hour. Occupation: Retired. Previously Desk Job -   Hobbies: Outdoor activities - maintains 6 acres at home. Family life: , 3 children - three boys, 1 grandchildren - enjoys visiting. Low stress in general - denies any concerns for anxiety/depression - overall enjoys life.   Last eye exam: 1/2021,abnormal - Wears glasses - followed 3 times per year - [facility] - retina specialists and glaucoma specialist.    Last dental exam: 2021    Preventive Care:  Health Maintenance   Topic Date Due    Hepatitis C screen  Never done    HIV screen  Never done    Shingles Vaccine (1 of 2) Never done    Potassium monitoring  10/04/2019    Creatinine monitoring  10/04/2019    A1C test (Diabetic or Prediabetic)  05/20/2020    Flu vaccine (Season Ended) 09/01/2021    Colon cancer screen colonoscopy  10/04/2022    Lipid screen  10/04/2023    DTaP/Tdap/Td vaccine (3 - Td or Tdap) 10/03/2027    COVID-19 Vaccine  Completed    Hepatitis A vaccine  Aged Out    Hepatitis B vaccine  Aged Out    Hib vaccine  Aged Out    Meningococcal (ACWY) vaccine  Aged Out    Pneumococcal 0-64 years Vaccine  Aged Out        Family History:  Colon Cancer: None  Thyroid Cancer/Disease: None  Melanoma: None  Prostate Cancer: Brother - dx at age 62  Testicular Cancer: None       Preventive plan of care for Mick Arenas        6/14/2021           Preventive Measures Status       Recommendations for screening   Prostate Cancer Screen  No results found for: PSA   Repeat yearly    Colon Cancer Screen  Last colonoscopy: 2017 Repeat in 5 years   Diabetes Screen  Glucose (mg/dL)   Date Value   10/04/2018 101    Repeat yearly   Cholesterol Screen  Lab Results   Component Value Date    TRIG 59 10/04/2018    HDL 54 10/04/2018    [address] 128 (A) 10/04/2018    Repeat yearly   Aspirin for Cardiovascular Prevention   No Not indicated   Weight: Body mass index is 29.83 kg/m².   6' 1.75\" (1.873 m)230 lb 12.8 oz (104.7 kg)  Your BMI is 25 or greater, which indicates that you are overweight   Living Will: Yes Copy requested    Recommended Immunizations    Immunization History   Administered Date(s) Administered    DT (pediatric) 11/22/2005    Influenza, Quadv, IM, PF (6 mo and older Fluzone, Flulaval, Fluarix, and 3 yrs and older Afluria) 12/02/2019    Tdap (Boostrix, Adacel) 10/03/2017    Influenza vaccine:  recommended every fall              Wt Readings from Last 3 Encounters:   06/14/21 230 lb 12.8 oz (104.7 kg)   03/11/20 234 lb 11.2 oz (106.5 kg)   12/02/19 243 lb 8 oz (110.5 kg)     BP Readings from Last 3 Encounters:   06/14/21 110/80   03/11/20 122/80   12/02/19 122/84       Patient Active Problem List   Diagnosis    Allergic rhinitis    Erectile dysfunction    Hyperlipidemia    Elevated blood pressure reading    Essential hypertension    Class 1 obesity due to excess calories without serious comorbidity with body mass index (BMI) of 33.0 to 33.9 in adult    Other specified glaucoma    Hypothyroidism    Prediabetes       Allergies   Allergen Reactions    Deconamine [Chlorpheniramine-Pseudoeph] Other (See Comments)     Hallucinations    Dorzolamide      Increased eye pressure    Viagra [Sildenafil Citrate] Other (See Comments)     Temporary blindness     Outpatient Medications Marked as Taking for the 6/14/21 encounter (Office Visit) with Merlinda Ramsay, APRN - CNP   Medication Sig Dispense Refill    levothyroxine (SYNTHROID) 100 MCG tablet TAKE 1 TABLET BY MOUTH EVERY DAY  30 tablet 0    brimonidine (ALPHAGAN P) 0.1 % SOLN 1 drop 2 times daily      Glucosamine-Chondroitin (OSTEO BI-FLEX REGULAR STRENGTH PO) Take by mouth      multivitamin (THERAGRAN) per tablet Take 1 tablet by mouth daily.            Past Medical History:   Diagnosis Date    Erectile dysfunction     Hyperlipidemia     Pneumonia 01/2019     Past Surgical History:   Procedure Laterality Date    COLONOSCOPY  9/2012    RETINAL DETACHMENT SURGERY      Bilateral- (L) Blind-Cosmetic Contact; 12 surgeries on left; 8 on right    UPPER GASTROINTESTINAL ENDOSCOPY N/A 5/23/2019    EGD BIOPSY performed by Maria Elena Watt MD at [address] [place] [address]     Family History   Problem Relation Age of Onset    Heart Disease Father     Kidney Disease Father     Coronary Art Dis Father     Heart Attack Father     Parkinsonism Brother     Cancer Sister         ovarian    Stroke Sister 64    Obesity Sister     Coronary Art Dis Mother     Heart Attack Mother     Prostate Cancer Brother 62     Social History     Socioeconomic History    Marital status:      Spouse name: Not on file    Number of children: Not on file    Years of education: Not on file    Highest education level: Not on file   Occupational History    Not on file   Tobacco Use    Smoking status: Never Smoker    Smokeless tobacco: Never Used   Substance and Sexual Activity  Alcohol use: Yes     Comment: social    Drug use: No    Sexual activity: Not on file   Other Topics Concern    Not on file   Social History Narrative    Not on file     Social Determinants of Health     Financial Resource Strain:     Difficulty of Paying Living Expenses:    Food Insecurity:     Worried About Running Out of Food in the Last Year:     [address] in the Last Year:    Transportation Needs:     Lack of Transportation (Medical):  Lack of Transportation (Non-Medical):    Physical Activity:     Days of Exercise per Week:     Minutes of Exercise per Session:    Stress:     Feeling of Stress :    Social Connections:     Frequency of Communication with Friends and Family:     Frequency of Social Gatherings with Friends and Family:     Attends [employer] Services:     Active Member of Clubs or Organizations:     Attends Club or Organization Meetings:     Marital Status:    Intimate Partner Violence:     Fear of Current or Ex-Partner:     Emotionally Abused:     Physically Abused:     Sexually Abused:      Review of Systems:  A comprehensive review of systems was negative except for what was noted in the HPI. Physical Exam:   Vitals:    06/14/21 1609   BP: 110/80   Site: Left Upper Arm   Position: Sitting   Cuff Size: Large Adult   Pulse: 74   Temp: 97.7 °F (36.5 °C)   SpO2: 98%   Weight: 230 lb 12.8 oz (104.7 kg)   Height: 6' 1.75\" (1.873 m)     Body mass index is 29.83 kg/m². Physical Exam  Constitutional:       General: He is not in acute distress. Appearance: Normal appearance. He is well-developed. HENT:      Head: Normocephalic and atraumatic. Right Ear: Hearing, tympanic membrane, ear canal and external ear normal.      Left Ear: Hearing, tympanic membrane, ear canal and external ear normal.      Nose: Nose normal. No mucosal edema. Mouth/Throat:      Pharynx: Uvula midline. Tonsils: No tonsillar exudate. 1+ on the right. 1+ on the left.    Eyes: General: Lids are normal.         Right eye: No discharge. Left eye: No discharge. Conjunctiva/sclera: Conjunctivae normal.      Pupils: Pupils are equal, round, and reactive to light. Neck:      Thyroid: No thyromegaly. Trachea: Trachea normal. No tracheal deviation. Cardiovascular:      Rate and Rhythm: Normal rate and regular rhythm. Heart sounds: Normal heart sounds, S1 normal and S2 normal.   Pulmonary:      Effort: Pulmonary effort is normal. No respiratory distress. Breath sounds: Normal breath sounds. Abdominal:      General: Bowel sounds are normal. There is no distension. Palpations: Abdomen is soft. Tenderness: There is no abdominal tenderness. There is no guarding. Musculoskeletal:         General: Normal range of motion. Cervical back: Normal range of motion. Lymphadenopathy:      Cervical: No cervical adenopathy. Skin:     General: Skin is warm and dry. Neurological:      Mental Status: He is alert and oriented to person, place, and time. Psychiatric:         Thought Content: Thought content normal.         Judgment: Judgment normal.        Assessment/Plan:    Other Recommendations   · See a dentist every 6 months  · Try to get at least 30 minutes of exercise 3-4 days per week  · Always wear a seat belt when traveling in a car       1. Annual physical exam  Encouraged healthy diet and regular exercise. Due for routine blood work. Otherwise UTD on healthcare maintenance. Follow up in 1 year for annual exam.  - CBC Auto Differential; Future  - Comprehensive Metabolic Panel, Fasting; Future  - Lipid, Fasting; Future  - PSA screening; Future  - TSH with Reflex; Future  - T4, Free; Future  - Hemoglobin A1C; Future    2. Hypothyroidism, unspecified type  Stable. Continue on current medication regimen. Complete routine blood work. Will adjust treatment plan if needed. - TSH with Reflex; Future  - T4, Free; Future    3.

## 2021-06-18 ENCOUNTER — TELEPHONE (OUTPATIENT)
Dept: FAMILY MEDICINE CLINIC | Age: 66
End: 2021-06-18

## 2021-06-18 DIAGNOSIS — E03.9 HYPOTHYROIDISM, UNSPECIFIED TYPE: ICD-10-CM

## 2021-06-18 RX ORDER — LEVOTHYROXINE SODIUM 0.1 MG/1
100 TABLET ORAL DAILY
Qty: 90 TABLET | Refills: 3 | Status: CANCELLED | OUTPATIENT
Start: 2021-06-18

## 2021-06-18 RX ORDER — LEVOTHYROXINE SODIUM 0.1 MG/1
TABLET ORAL
Qty: 30 TABLET | Refills: 11 | Status: SHIPPED | OUTPATIENT
Start: 2021-06-18 | End: 2022-07-18

## 2022-07-15 DIAGNOSIS — E03.9 HYPOTHYROIDISM, UNSPECIFIED TYPE: ICD-10-CM

## 2022-07-18 RX ORDER — LEVOTHYROXINE SODIUM 0.1 MG/1
TABLET ORAL
Qty: 30 TABLET | Refills: 0 | Status: SHIPPED | OUTPATIENT
Start: 2022-07-18 | End: 2022-08-12 | Stop reason: SDUPTHER

## 2022-07-18 NOTE — TELEPHONE ENCOUNTER
Medication:   Requested Prescriptions     Pending Prescriptions Disp Refills    levothyroxine (EUTHYROX) 100 MCG tablet [Pharmacy Med Name: Euthyrox 100 MCG Oral Tablet] 30 tablet 0     Sig: Take 1 tablet by mouth once daily      Last Filled:     Patient Phone Number: [phone] (home)     Last appt: 6/14/2021   Next appt: Visit date not found    Last OARRS: No flowsheet data found.   PDMP Monitoring:    Last PDMP Mary Campos as Reviewed [facility]):  Review User Review Instant Review Result   Sriram ABARCA 6/14/2021  4:20 PM Reviewed PDMP [1]     Preferred Pharmacy:   [id number] - [facility], [address] [place] [address]  Phone: [phone] Fax: [phone]    [facility] [id number] - [address] [phone] Oleg Choudhury [phone]  [place] [address]  Phone: [phone] Fax: [phone]    [address] [facility], [address] [place] [address]  Phone: [phone] Fax: [phone]

## 2022-07-22 ENCOUNTER — TELEPHONE (OUTPATIENT)
Dept: FAMILY MEDICINE CLINIC | Age: 67
End: 2022-07-22

## 2022-07-22 DIAGNOSIS — Z12.5 SCREENING FOR MALIGNANT NEOPLASM OF PROSTATE: ICD-10-CM

## 2022-07-22 DIAGNOSIS — R73.03 PREDIABETES: ICD-10-CM

## 2022-07-22 DIAGNOSIS — E78.41 ELEVATED LIPOPROTEIN(A): ICD-10-CM

## 2022-07-22 DIAGNOSIS — E03.9 HYPOTHYROIDISM, UNSPECIFIED TYPE: ICD-10-CM

## 2022-07-22 DIAGNOSIS — Z00.00 ANNUAL PHYSICAL EXAM: Primary | ICD-10-CM

## 2022-07-22 NOTE — TELEPHONE ENCOUNTER
Patient's wife calling on his behalf. He is wanting blood work orders, especially thyroid levels, to get them done before his appt with Yuli Cat on 8/12. Please advise.

## 2022-08-01 LAB
A/G RATIO: 1.1 (ref 1–2)
ALBUMIN SERPL-MCNC: 3.8 G/DL (ref 3.5–5.7)
ALBUMIN/PROTEIN TOTAL, SER/PL: 7.3 G/DL (ref 6–8.3)
ALP BLD-CCNC: 69 U/L (ref 34–104)
ALT SERPL-CCNC: 15 U/L (ref 7–52)
ANION GAP 4: 8 MMOL/L (ref 7–16)
AST W/O P-5'-P: 19 U/L (ref 13–39)
BASOPHILS # BLD: 0.7 %
BASOPHILS ABSOLUTE: 0 K/UL (ref 0–0.1)
BILIRUB SERPL-MCNC: 1.1 MG/DL (ref 0.3–1)
BUN BLDV-MCNC: 14 MG/DL (ref 7–25)
CALCIUM SERPL-MCNC: 9.3 MG/DL (ref 8.6–10.2)
CHLORIDE BLD-SCNC: 102 MMOL/L (ref 98–107)
CHOLESTEROL, STONE: 146 MG/DL
CO2: 28 MMOL/L (ref 21–31)
CREATININE + EGFR PANEL: 1 MG/DL (ref 0.7–1.3)
DEPRECATED MEAN BLOOD GLUCOSE: 120 MG/DL (ref 68–126)
EOSINOPHILS ABSOLUTE: 0.2 K/UL (ref 0–0.4)
EOSINOPHILS RELATIVE PERCENT: 3.9 %
GFR CALCULATED: > 60 ML/MIN/1.73M2
GFR CALCULATED: > 60 ML/MIN/1.73M2
GLOBULIN: 3.5 G/DL (ref 2.6–4.2)
GLUCOSE: 93 MG/DL (ref 74–109)
HBA1C MFR BLD: 5.8 % (ref 4–6)
HDLC SERPL-MCNC: 39 MG/DL (ref 40–60)
HEMOGLOBIN URINE, QUAL: 14.4 G/DL (ref 13.1–17.6)
LDL CHOLESTEROL CALCULATED: 98 MG/DL (ref 0–99)
LYMPHOCYTES # BLD: 27.4 %
LYMPHOCYTES ABSOLUTE: 1.7 K/UL (ref 0.8–3.6)
MCH RBC QN AUTO: 29.9 PG (ref 28.4–33.4)
MCHC RBC AUTO-ENTMCNC: 33.4 G/DL (ref 31.1–37)
MCV RBC AUTO: 89.4 FL (ref 85–99)
MONOCYTES # BLD: 13.5 %
MONOCYTES ABSOLUTE: 0.9 K/UL (ref 0.3–0.9)
NEUTROPHILS ABSOLUTE: 3.4 K/UL (ref 2–7.3)
NEUTROPHILS/100 LEUKOCYTES: 54.5 %
PDW BLD-RTO: 14.1 % (ref 11.7–15.2)
PLATELET COUNT MANUAL: 300 K/UL (ref 154–393)
POTASSIUM SERPL-SCNC: 4.1 MMOL/L (ref 3.5–5.3)
PROSTATE SPECIFIC ANTIGEN: 1.36 (ref 0.01–4)
RBC # BLD: 4.83 M/UL (ref 4.3–5.86)
SODIUM BLD-SCNC: 138 MMOL/L (ref 136–145)
SR HEMATOCRIT: 43.1 % (ref 39–51.5)
T4 FREE: 0.8 NG/DL (ref 0.61–1.12)
TRIGL SERPL-MCNC: 46 MG/DL
TSH ULTRASENSITIVE: 5.33 UIU/ML (ref 0.45–5.33)
VLDLC SERPL CALC-MCNC: 9 MG/DL (ref 0–40)
WBC BLOOD, MANUAL: 6.3 K/UL (ref 4–10.5)

## 2022-08-12 ENCOUNTER — OFFICE VISIT (OUTPATIENT)
Dept: FAMILY MEDICINE CLINIC | Age: 67
End: 2022-08-12
Payer: MEDICARE

## 2022-08-12 VITALS
TEMPERATURE: 98 F | OXYGEN SATURATION: 98 % | WEIGHT: 230.4 LBS | HEART RATE: 53 BPM | BODY MASS INDEX: 29.57 KG/M2 | SYSTOLIC BLOOD PRESSURE: 130 MMHG | HEIGHT: 74 IN | DIASTOLIC BLOOD PRESSURE: 84 MMHG

## 2022-08-12 DIAGNOSIS — R42 DIZZINESS: Primary | ICD-10-CM

## 2022-08-12 DIAGNOSIS — R55 PRE-SYNCOPE: ICD-10-CM

## 2022-08-12 DIAGNOSIS — Z23 NEED FOR VACCINATION: ICD-10-CM

## 2022-08-12 DIAGNOSIS — E03.9 HYPOTHYROIDISM, UNSPECIFIED TYPE: ICD-10-CM

## 2022-08-12 DIAGNOSIS — G89.29 CHRONIC PAIN OF RIGHT KNEE: ICD-10-CM

## 2022-08-12 DIAGNOSIS — R42 VERTIGO: ICD-10-CM

## 2022-08-12 DIAGNOSIS — M25.561 CHRONIC PAIN OF RIGHT KNEE: ICD-10-CM

## 2022-08-12 PROCEDURE — 99214 OFFICE O/P EST MOD 30 MIN: CPT | Performed by: NURSE PRACTITIONER

## 2022-08-12 PROCEDURE — G8417 CALC BMI ABV UP PARAM F/U: HCPCS | Performed by: NURSE PRACTITIONER

## 2022-08-12 PROCEDURE — 1123F ACP DISCUSS/DSCN MKR DOCD: CPT | Performed by: NURSE PRACTITIONER

## 2022-08-12 PROCEDURE — 3017F COLORECTAL CA SCREEN DOC REV: CPT | Performed by: NURSE PRACTITIONER

## 2022-08-12 PROCEDURE — G8427 DOCREV CUR MEDS BY ELIG CLIN: HCPCS | Performed by: NURSE PRACTITIONER

## 2022-08-12 PROCEDURE — 90732 PPSV23 VACC 2 YRS+ SUBQ/IM: CPT | Performed by: NURSE PRACTITIONER

## 2022-08-12 PROCEDURE — G0009 ADMIN PNEUMOCOCCAL VACCINE: HCPCS | Performed by: NURSE PRACTITIONER

## 2022-08-12 PROCEDURE — 1036F TOBACCO NON-USER: CPT | Performed by: NURSE PRACTITIONER

## 2022-08-12 RX ORDER — TIMOLOL MALEATE 5 MG/ML
SOLUTION/ DROPS OPHTHALMIC
COMMUNITY
Start: 2022-07-30

## 2022-08-12 RX ORDER — LEVOTHYROXINE SODIUM 0.1 MG/1
TABLET ORAL
Qty: 90 TABLET | Refills: 0 | Status: SHIPPED | OUTPATIENT
Start: 2022-08-12

## 2022-08-12 ASSESSMENT — PATIENT HEALTH QUESTIONNAIRE - PHQ9
SUM OF ALL RESPONSES TO PHQ QUESTIONS 1-9: 0
2. FEELING DOWN, DEPRESSED OR HOPELESS: 0
1. LITTLE INTEREST OR PLEASURE IN DOING THINGS: 0
SUM OF ALL RESPONSES TO PHQ QUESTIONS 1-9: 0
SUM OF ALL RESPONSES TO PHQ9 QUESTIONS 1 & 2: 0
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
## 2022-08-17 ASSESSMENT — ENCOUNTER SYMPTOMS
VOMITING: 0
COUGH: 0
SHORTNESS OF BREATH: 0
NAUSEA: 0
DIARRHEA: 0

## 2022-08-22 ENCOUNTER — HOSPITAL ENCOUNTER (OUTPATIENT)
Dept: VASCULAR LAB | Age: 67
Discharge: HOME | End: 2022-08-22
Payer: MEDICARE

## 2022-08-22 DIAGNOSIS — R42 VERTIGO: ICD-10-CM

## 2022-08-22 DIAGNOSIS — R42 DIZZINESS: ICD-10-CM

## 2022-08-22 DIAGNOSIS — R55 PRE-SYNCOPE: ICD-10-CM

## 2022-08-22 PROCEDURE — 93880 EXTRACRANIAL BILAT STUDY: CPT

## 2022-11-16 ENCOUNTER — TELEPHONE (OUTPATIENT)
Dept: FAMILY MEDICINE CLINIC | Age: 67
End: 2022-11-16

## 2022-11-16 DIAGNOSIS — E03.9 HYPOTHYROIDISM, UNSPECIFIED TYPE: Primary | ICD-10-CM

## 2022-11-16 RX ORDER — LEVOTHYROXINE SODIUM 112 UG/1
112 TABLET ORAL DAILY
Qty: 90 TABLET | Refills: 0 | Status: SHIPPED | OUTPATIENT
Start: 2022-11-16

## 2022-11-16 NOTE — TELEPHONE ENCOUNTER
Patient called in stating that he had test done on 11/8/22, and it indicated that he may need his medication dosage change.         levothyroxine (EUTHYROX) 100 MCG tablet [[id number]]     Order Details  Dose, Route, Frequency: As Directed   Dispense Quantity: 90 tablet Refills: 0          Sig: Take 1 tablet by mouth once daily             Thank you

## 2022-12-14 ENCOUNTER — OFFICE VISIT (OUTPATIENT)
Dept: ORTHOPEDIC SURGERY | Age: 67
End: 2022-12-14

## 2022-12-14 VITALS — HEIGHT: 74 IN | BODY MASS INDEX: 29.52 KG/M2 | WEIGHT: 230 LBS

## 2022-12-14 DIAGNOSIS — M25.561 RIGHT KNEE PAIN, UNSPECIFIED CHRONICITY: Primary | ICD-10-CM

## 2022-12-14 RX ORDER — METHYLPREDNISOLONE ACETATE 40 MG/ML
40 INJECTION, SUSPENSION INTRA-ARTICULAR; INTRALESIONAL; INTRAMUSCULAR; SOFT TISSUE ONCE
Status: COMPLETED | OUTPATIENT
Start: 2022-12-14 | End: 2022-12-14

## 2022-12-14 RX ORDER — BUPIVACAINE HYDROCHLORIDE 5 MG/ML
4 INJECTION, SOLUTION PERINEURAL ONCE
Status: COMPLETED | OUTPATIENT
Start: 2022-12-14 | End: 2022-12-14

## 2022-12-14 RX ADMIN — METHYLPREDNISOLONE ACETATE 40 MG: 40 INJECTION, SUSPENSION INTRA-ARTICULAR; INTRALESIONAL; INTRAMUSCULAR; SOFT TISSUE at 09:01

## 2022-12-14 RX ADMIN — BUPIVACAINE HYDROCHLORIDE 20 MG: 5 INJECTION, SOLUTION PERINEURAL at 09:00
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
## 2023-01-07 LAB
T4 FREE: 0.86 NG/DL (ref 0.61–1.12)
TSH ULTRASENSITIVE: 2.65 UIU/ML (ref 0.45–5.33)

## 2023-01-09 DIAGNOSIS — E03.9 HYPOTHYROIDISM, UNSPECIFIED TYPE: ICD-10-CM

## 2023-01-09 RX ORDER — LEVOTHYROXINE SODIUM 112 UG/1
112 TABLET ORAL DAILY
Qty: 90 TABLET | Refills: 1 | Status: SHIPPED | OUTPATIENT
Start: 2023-01-09

## 2023-01-09 NOTE — TELEPHONE ENCOUNTER
Patient had blood work completed to check Thyroid Levels- per Shanda Brood stable continue on current dose. Patient requested refills be sent to pharmacy. Prescription sent.

## 2023-06-01 LAB — PROSTATE SPECIFIC ANTIGEN: 1.36 NG/ML (ref 0.01–4)

## 2023-08-08 ENCOUNTER — TELEPHONE (OUTPATIENT)
Dept: FAMILY MEDICINE CLINIC | Age: 68
End: 2023-08-08

## 2023-08-08 DIAGNOSIS — R73.03 PREDIABETES: ICD-10-CM

## 2023-08-08 DIAGNOSIS — E78.41 ELEVATED LIPOPROTEIN(A): ICD-10-CM

## 2023-08-08 DIAGNOSIS — Z00.00 ANNUAL PHYSICAL EXAM: ICD-10-CM

## 2023-08-08 DIAGNOSIS — E03.9 HYPOTHYROIDISM, UNSPECIFIED TYPE: Primary | ICD-10-CM

## 2023-08-08 DIAGNOSIS — Z12.5 SCREENING FOR MALIGNANT NEOPLASM OF PROSTATE: ICD-10-CM

## 2023-08-08 NOTE — TELEPHONE ENCOUNTER
----- Message from Justyn Stage sent at 8/8/2023  8:30 AM EDT -----  Subject: Referral Request    Reason for referral request? Patient is requesting the regular labs that   come with an annual well visit. Provider patient wants to be referred to(if known):     Provider Phone Number(if known): Additional Information for Provider?  Patient is requesting the lab orders   copies be sent to him via the mail.   ---------------------------------------------------------------------------  --------------  [address]    [phone]; OK to leave message on voicemail  ---------------------------------------------------------------------------  --------------

## 2023-08-09 NOTE — TELEPHONE ENCOUNTER
Left message for patient to return call. Need to clarify on what email he would like them to be sent to.

## 2023-08-11 LAB
A/G RATIO: 0.8 (ref 1–2)
ALBUMIN SERPL-MCNC: 3.6 G/DL (ref 3.5–5.7)
ALBUMIN/PROTEIN TOTAL, SER/PL: 7.9 G/DL (ref 6–8.3)
ALP BLD-CCNC: 60 U/L (ref 34–104)
ALT SERPL-CCNC: 14 U/L (ref 7–52)
ANION GAP 4: 9 MMOL/L (ref 7–16)
AST W/O P-5'-P: 16 U/L (ref 13–39)
BILIRUB SERPL-MCNC: 0.8 MG/DL (ref 0.3–1)
BUN BLDV-MCNC: 17 MG/DL (ref 7–25)
CALCIUM SERPL-MCNC: 9.5 MG/DL (ref 8.6–10.2)
CHLORIDE BLD-SCNC: 103 MMOL/L (ref 98–107)
CHOLESTEROL, STONE: 132 MG/DL
CO2: 27 MMOL/L (ref 21–31)
CREAT SERPL-MCNC: 0.97 MG/DL (ref 0.7–1.3)
DEPRECATED MEAN BLOOD GLUCOSE: 120 MG/DL (ref 68–126)
EGFR MALE: 86 ML/MIN/1.73M2
GLOBULIN: 4.3 G/DL (ref 2.6–4.2)
GLUCOSE: 97 MG/DL (ref 74–109)
HBA1C MFR BLD: 5.8 % (ref 4–6)
HDLC SERPL-MCNC: 40 MG/DL (ref 40–60)
LDL CHOLESTEROL CALCULATED: 82 MG/DL (ref 0–99)
POTASSIUM SERPL-SCNC: 4.1 MMOL/L (ref 3.5–5.1)
SODIUM BLD-SCNC: 139 MMOL/L (ref 136–145)
TRIGL SERPL-MCNC: 50 MG/DL
TSH ULTRASENSITIVE: 2.05 UIU/ML (ref 0.45–5.33)
VLDLC SERPL CALC-MCNC: 10 MG/DL (ref 0–40)

## 2023-08-14 LAB
CREAT SERPL-MCNC: 0.94 MG/DL (ref 0.7–1.3)
EGFR MALE: 89 ML/MIN/1.73M2
MAGNESIUM: 2 MG/DL (ref 1.6–2.4)

## 2023-08-18 ENCOUNTER — OFFICE VISIT (OUTPATIENT)
Dept: FAMILY MEDICINE CLINIC | Age: 68
End: 2023-08-18
Payer: MEDICARE

## 2023-08-18 VITALS
TEMPERATURE: 97.7 F | HEART RATE: 70 BPM | BODY MASS INDEX: 29 KG/M2 | HEIGHT: 74 IN | OXYGEN SATURATION: 98 % | DIASTOLIC BLOOD PRESSURE: 80 MMHG | SYSTOLIC BLOOD PRESSURE: 118 MMHG | WEIGHT: 226 LBS

## 2023-08-18 DIAGNOSIS — E03.9 HYPOTHYROIDISM, UNSPECIFIED TYPE: ICD-10-CM

## 2023-08-18 DIAGNOSIS — Z00.00 ENCOUNTER FOR ANNUAL WELLNESS EXAM IN MEDICARE PATIENT: Primary | ICD-10-CM

## 2023-08-18 DIAGNOSIS — M25.561 CHRONIC PAIN OF RIGHT KNEE: ICD-10-CM

## 2023-08-18 DIAGNOSIS — R73.03 PREDIABETES: ICD-10-CM

## 2023-08-18 DIAGNOSIS — K22.10 ULCER OF ESOPHAGUS WITHOUT BLEEDING: ICD-10-CM

## 2023-08-18 DIAGNOSIS — G89.29 CHRONIC PAIN OF RIGHT KNEE: ICD-10-CM

## 2023-08-18 DIAGNOSIS — R42 VERTIGO: ICD-10-CM

## 2023-08-18 DIAGNOSIS — R13.10 DYSPHAGIA, UNSPECIFIED TYPE: ICD-10-CM

## 2023-08-18 PROCEDURE — 1036F TOBACCO NON-USER: CPT | Performed by: NURSE PRACTITIONER

## 2023-08-18 PROCEDURE — G0439 PPPS, SUBSEQ VISIT: HCPCS | Performed by: NURSE PRACTITIONER

## 2023-08-18 PROCEDURE — G8427 DOCREV CUR MEDS BY ELIG CLIN: HCPCS | Performed by: NURSE PRACTITIONER

## 2023-08-18 PROCEDURE — 1123F ACP DISCUSS/DSCN MKR DOCD: CPT | Performed by: NURSE PRACTITIONER

## 2023-08-18 PROCEDURE — 3074F SYST BP LT 130 MM HG: CPT | Performed by: NURSE PRACTITIONER

## 2023-08-18 PROCEDURE — 99214 OFFICE O/P EST MOD 30 MIN: CPT | Performed by: NURSE PRACTITIONER

## 2023-08-18 PROCEDURE — 3017F COLORECTAL CA SCREEN DOC REV: CPT | Performed by: NURSE PRACTITIONER

## 2023-08-18 PROCEDURE — G8417 CALC BMI ABV UP PARAM F/U: HCPCS | Performed by: NURSE PRACTITIONER

## 2023-08-18 PROCEDURE — 3078F DIAST BP <80 MM HG: CPT | Performed by: NURSE PRACTITIONER

## 2023-08-18 RX ORDER — OMEPRAZOLE 40 MG/1
CAPSULE, DELAYED RELEASE ORAL DAILY
COMMUNITY
Start: 2023-08-04

## 2023-08-18 RX ORDER — LEVOTHYROXINE SODIUM 112 UG/1
112 TABLET ORAL DAILY
Qty: 90 TABLET | Refills: 3 | Status: SHIPPED | OUTPATIENT
Start: 2023-08-18

## 2023-08-18 RX ORDER — WHEAT DEXTRIN 3 G/3.8 G
4 POWDER (GRAM) ORAL DAILY
COMMUNITY

## 2023-08-18 SDOH — ECONOMIC STABILITY: HOUSING INSECURITY
IN THE LAST 12 MONTHS, WAS THERE A TIME WHEN YOU DID NOT HAVE A STEADY PLACE TO SLEEP OR SLEPT IN A SHELTER (INCLUDING NOW)?: NO

## 2023-08-18 SDOH — ECONOMIC STABILITY: FOOD INSECURITY: WITHIN THE PAST 12 MONTHS, THE FOOD YOU BOUGHT JUST DIDN'T LAST AND YOU DIDN'T HAVE MONEY TO GET MORE.: NEVER TRUE

## 2023-08-18 SDOH — ECONOMIC STABILITY: INCOME INSECURITY: HOW HARD IS IT FOR YOU TO PAY FOR THE VERY BASICS LIKE FOOD, HOUSING, MEDICAL CARE, AND HEATING?: NOT HARD AT ALL

## 2023-08-18 SDOH — ECONOMIC STABILITY: FOOD INSECURITY: WITHIN THE PAST 12 MONTHS, YOU WORRIED THAT YOUR FOOD WOULD RUN OUT BEFORE YOU GOT MONEY TO BUY MORE.: NEVER TRUE

## 2023-08-18 ASSESSMENT — PATIENT HEALTH QUESTIONNAIRE - PHQ9
2. FEELING DOWN, DEPRESSED OR HOPELESS: 0
1. LITTLE INTEREST OR PLEASURE IN DOING THINGS: 0
SUM OF ALL RESPONSES TO PHQ QUESTIONS 1-9: 0
SUM OF ALL RESPONSES TO PHQ9 QUESTIONS 1 & 2: 0
SUM OF ALL RESPONSES TO PHQ QUESTIONS 1-9: 0

## 2023-08-18 NOTE — PROGRESS NOTES
[facility] VISIT    Patient is here for their Medicare Annual Wellness Visit and follow up on chronic health conditions. Last eye exam: March 2023  Last dental exam: July 2023  Exercise: Yes- Weight routine every other day, treadmill and exercise bike. Diet: well balanced- working on it due to recent EGD results. How would you rate your overall health? : Very good    Fall Risk 8/18/2023 8/12/2022   2 or more falls in past year? no no   Fall with injury in past year? no no     PHQ Scores 8/18/2023 8/12/2022 6/14/2021 5/20/2019 10/3/2017   PHQ2 Score 0 0 0 0 0   PHQ9 Score 0 0 0 0 0     Do you always wear a seat belt in the car?: Yes    Have you noted any problems with hearing?: No  Have you noted any vision problems?: No  Do you have concerns about your sexual health?: no  In the past month how much has pain been an issue for you?:  Not at all  In the past month have you had issues with anxiety, loneliness, irritability or fatigue:  Not at all    Do you take opioid medications even sometimes? No     Living Will: Yes,   Copy requested    Healthcare Decision Maker:    Primary Decision MakeJose Merchant - [place] - [phone]  Click here to complete Healthcare Decision Makers including selection of the Healthcare Decision Maker Relationship (ie \"Primary\"). Today we documented Decision Maker(s) consistent with Legal Next of Kin hierarchy. Who lives at home with you: Wife- Sheila Burciaga you have any pets? Dog- 1 Cats-2  Do you have any services coming to your home (meals on wheels, home health, etc) ? : no      Do you need help with:  Using the phone:  No  Bathing: No  Dressing:  No  Toileting: No  Transportation:  No  Shopping: No  Preparing meals: No  Housework/Laundry: No  Medications: No  Money management: No    Does your home have:  Unsecured throw rugs: No  Grab bars in bathroom: Yes  Walk in shower: No  Seat in shower: No  Lit pathways for night (nightlights): Yes  [employer] Alert

## 2023-12-11 ENCOUNTER — OFFICE VISIT (OUTPATIENT)
Age: 68
End: 2023-12-11

## 2023-12-11 VITALS
HEART RATE: 68 BPM | SYSTOLIC BLOOD PRESSURE: 132 MMHG | TEMPERATURE: 98 F | DIASTOLIC BLOOD PRESSURE: 90 MMHG | WEIGHT: 215 LBS | OXYGEN SATURATION: 96 % | BODY MASS INDEX: 27.6 KG/M2

## 2023-12-11 DIAGNOSIS — H10.33 ACUTE CONJUNCTIVITIS OF BOTH EYES, UNSPECIFIED ACUTE CONJUNCTIVITIS TYPE: Primary | ICD-10-CM

## 2023-12-11 RX ORDER — TOBRAMYCIN 3 MG/ML
1 SOLUTION/ DROPS OPHTHALMIC EVERY 4 HOURS
Qty: 5 ML | Refills: 0 | Status: SHIPPED | OUTPATIENT
Start: 2023-12-11 | End: 2023-12-18

## 2023-12-11 ASSESSMENT — ENCOUNTER SYMPTOMS
EYE ITCHING: 1
PHOTOPHOBIA: 0
SINUS PRESSURE: 0
COUGH: 0
NAUSEA: 0
FOREIGN BODY SENSATION: 0
SORE THROAT: 0
VOMITING: 0
WHEEZING: 0
EYE DISCHARGE: 1
EYE REDNESS: 1

## 2024-08-16 DIAGNOSIS — E03.9 HYPOTHYROIDISM, UNSPECIFIED TYPE: ICD-10-CM

## 2024-08-16 RX ORDER — LEVOTHYROXINE SODIUM 112 UG/1
112 TABLET ORAL DAILY
Qty: 90 TABLET | Refills: 0 | Status: SHIPPED | OUTPATIENT
Start: 2024-08-16

## 2024-08-23 SDOH — ECONOMIC STABILITY: FOOD INSECURITY: WITHIN THE PAST 12 MONTHS, THE FOOD YOU BOUGHT JUST DIDN'T LAST AND YOU DIDN'T HAVE MONEY TO GET MORE.: PATIENT DECLINED

## 2024-08-23 SDOH — ECONOMIC STABILITY: FOOD INSECURITY: WITHIN THE PAST 12 MONTHS, YOU WORRIED THAT YOUR FOOD WOULD RUN OUT BEFORE YOU GOT MONEY TO BUY MORE.: PATIENT DECLINED

## 2024-08-23 SDOH — HEALTH STABILITY: PHYSICAL HEALTH: ON AVERAGE, HOW MANY MINUTES DO YOU ENGAGE IN EXERCISE AT THIS LEVEL?: 50 MIN

## 2024-08-23 SDOH — HEALTH STABILITY: PHYSICAL HEALTH: ON AVERAGE, HOW MANY DAYS PER WEEK DO YOU ENGAGE IN MODERATE TO STRENUOUS EXERCISE (LIKE A BRISK WALK)?: 6 DAYS

## 2024-08-23 SDOH — ECONOMIC STABILITY: TRANSPORTATION INSECURITY
IN THE PAST 12 MONTHS, HAS LACK OF TRANSPORTATION KEPT YOU FROM MEETINGS, WORK, OR FROM GETTING THINGS NEEDED FOR DAILY LIVING?: NO

## 2024-08-23 SDOH — ECONOMIC STABILITY: INCOME INSECURITY: HOW HARD IS IT FOR YOU TO PAY FOR THE VERY BASICS LIKE FOOD, HOUSING, MEDICAL CARE, AND HEATING?: PATIENT DECLINED

## 2024-08-23 ASSESSMENT — PATIENT HEALTH QUESTIONNAIRE - PHQ9
SUM OF ALL RESPONSES TO PHQ QUESTIONS 1-9: 0
SUM OF ALL RESPONSES TO PHQ9 QUESTIONS 1 & 2: 0
2. FEELING DOWN, DEPRESSED OR HOPELESS: NOT AT ALL
SUM OF ALL RESPONSES TO PHQ QUESTIONS 1-9: 0
1. LITTLE INTEREST OR PLEASURE IN DOING THINGS: NOT AT ALL
SUM OF ALL RESPONSES TO PHQ QUESTIONS 1-9: 0
SUM OF ALL RESPONSES TO PHQ QUESTIONS 1-9: 0

## 2024-08-23 ASSESSMENT — LIFESTYLE VARIABLES
HOW OFTEN DO YOU HAVE A DRINK CONTAINING ALCOHOL: 2-4 TIMES A MONTH
HOW MANY STANDARD DRINKS CONTAINING ALCOHOL DO YOU HAVE ON A TYPICAL DAY: 1
HOW MANY STANDARD DRINKS CONTAINING ALCOHOL DO YOU HAVE ON A TYPICAL DAY: 1 OR 2
HOW OFTEN DO YOU HAVE SIX OR MORE DRINKS ON ONE OCCASION: 1
HOW OFTEN DO YOU HAVE A DRINK CONTAINING ALCOHOL: 3

## 2024-08-26 ENCOUNTER — OFFICE VISIT (OUTPATIENT)
Dept: FAMILY MEDICINE CLINIC | Age: 69
End: 2024-08-26

## 2024-08-26 VITALS
HEIGHT: 74 IN | TEMPERATURE: 97.4 F | BODY MASS INDEX: 28.88 KG/M2 | HEART RATE: 66 BPM | WEIGHT: 225 LBS | SYSTOLIC BLOOD PRESSURE: 126 MMHG | DIASTOLIC BLOOD PRESSURE: 75 MMHG

## 2024-08-26 DIAGNOSIS — E03.9 HYPOTHYROIDISM, UNSPECIFIED TYPE: ICD-10-CM

## 2024-08-26 DIAGNOSIS — Z00.00 ENCOUNTER FOR ANNUAL WELLNESS EXAM IN MEDICARE PATIENT: Primary | ICD-10-CM

## 2024-08-26 DIAGNOSIS — Z13.6 ENCOUNTER FOR SCREENING FOR CARDIOVASCULAR DISORDERS: ICD-10-CM

## 2024-08-26 DIAGNOSIS — Z13.220 SCREENING FOR HYPERLIPIDEMIA: ICD-10-CM

## 2024-08-26 DIAGNOSIS — Z12.5 SCREENING FOR MALIGNANT NEOPLASM OF PROSTATE: ICD-10-CM

## 2024-08-26 DIAGNOSIS — R73.03 PREDIABETES: ICD-10-CM

## 2024-08-26 DIAGNOSIS — R13.10 DYSPHAGIA, UNSPECIFIED TYPE: ICD-10-CM

## 2024-08-26 DIAGNOSIS — Z13.1 SCREENING FOR DIABETES MELLITUS: ICD-10-CM

## 2024-08-26 DIAGNOSIS — M25.561 CHRONIC PAIN OF RIGHT KNEE: ICD-10-CM

## 2024-08-26 DIAGNOSIS — G89.29 CHRONIC PAIN OF RIGHT KNEE: ICD-10-CM

## 2024-08-26 RX ORDER — WHEAT DEXTRIN 3 G/3.8 G
4 POWDER (GRAM) ORAL DAILY
COMMUNITY

## 2024-08-26 NOTE — PATIENT INSTRUCTIONS
--Exercise and Seniors  Is it safe for me to exercise?  It is safe for most adults older than 65 years of age to exercise. Even patients who have chronic illnesses such as heart disease, high blood pressure, diabetes and arthritis can exercise safely. Many of these conditions are improved with exercise. If you are not sure if exercise is safe for you or if you are currently inactive, ask your doctor.    How do I get started?  It is important to wear loose, comfortable clothing and well-fitting, sturdy shoes. Your shoes should have a good arch support, and an elevated and cushioned heel to absorb shock.If you are not already active, begin slowly. Start with exercises that you are already comfortable doing. Starting slowly makes it less likely that you will injure yourself. Starting slowly also helps prevent soreness. The saying “no pain, no gain” is not true for older or elderly adults. You do not have to exercise at a high intensity to get most health benefits.For example, walking is an excellent activity to start with. As you become used to exercising, or if you are already active, you can slowly increase the intensity of your exercise program.    What type of exercise should I do?  There are several types of exercise that you should do. You will want to do some type of aerobic activity for at least 30 minutes on most days of the week. Examples are walking, swimming and bicycling. You should also do resistance (also called strength training) 2 days per week.Warm up for 5 minutes before each exercise session. Walking slowly and then stretching are good warm-up activities. You should also cool down with more stretching for 5 minutes when you finish exercising. Cool down longer in warmer weather.Exercise is only good for you if you are feeling well. Wait to exercise until you feel better if you have a cold, the flu or another illness. If you miss exercise for more  than 2 weeks, be sure to start slowly again.    When  should I call my doctor?  If your muscles or joints are sore the day after exercising, you may have done too much. Next time, exercise at a lower intensity. If the pain or discomfort persists, you should talk to your doctor. You should also talk to your doctor if you have any of the following symptoms while exercising:  -Chest pain or pressure  -Trouble breathing or excessive shortness of breath  -lightheadedness or dizziness  -difficulty with balance  -nausea    What are some specific exercises I can do?  The following shows some simple strength exercises that you can do at home. Each exercise should be done 8 to 10 times for 2 sets.  Remember to:   -Complete all movements in a slow, controlled fashion  -Don’t hold your breath  -Stop if you feel pain   -Stretch each muscle after your workout.      Wall Pushups  Place hands flat against the wall. Slowly lower body to the wall. Push body away from wall to return to starting position.    Chair Squats  Begin by sitting in the chair. Lean slightly forward and stand up from the chair. Try not to favor one side or use your hands to help you.    Bicep Curls  Hold a weight in each hand with your arms at your sides. Bending your arms at the elbows, lift the weights to your shoulders and then lower them to your sides.    Shoulder Shrugs  Hold a weight in each hand with your arms at your side. Shrug your shoulders up toward your ears and then lower them back down.      Citations  Promoting and Prescribing Exercise for the Elderly by AARON Lara M.D., and CHRIS Beard, Ph.D.(02/01/02, http://www.aafp.org/afp/[email or web address]/[email or web address].html)    Last Updated: January 2011  This article was contributed by: familydoctor.org editorial staff  Copyright © American [employer]  This information provides a general overview and may not apply to everyone. Talk to your family doctor to find out if this information applies to you and to get more information on this subject.

## 2024-08-26 NOTE — PROGRESS NOTES
MEDICARE ANNUAL WELLNESS VISIT    Patient is here for their Medicare Annual Wellness Visit and follow up on chronic health conditions.     Last eye exam: 04/2024   Last dental exam: 02/2024  Exercise:  daily, walking  Do you eat balanced/healthy meals regularly? Yes    How would you rate your overall health? : Good        8/23/2024     6:27 PM 8/18/2023     9:30 AM 8/12/2022     8:28 AM   Fall Risk   Do you feel unsteady or are you worried about falling?  no no    2 or more falls in past year? no no no   Fall with injury in past year? no no no         8/23/2024     6:27 PM 8/18/2023     9:30 AM 8/12/2022     8:28 AM 6/14/2021     4:09 PM 5/20/2019     9:52 AM 10/3/2017     7:48 AM   PHQ Scores   PHQ2 Score 0 0 0 0 0 0   PHQ9 Score 0 0 0 0 0 0     Do you always wear a seat belt in the car?: Yes    Have you noted any problems with hearing?: No  Have you noted any vision problems?: No  Do you have concerns about your sexual health?: no  In the past month how much has pain been an issue for you?:  Not at all  In the past month have you had issues with anxiety, loneliness, irritability or fatigue:  Not at all    Do you take opioid medications even sometimes? No (if using assess risk and whether other treatments would be beneficial)    Living Will and/or Healthcare POA: Yes,   Copy requested    Healthcare Decision Maker:    Primary Decision Maker: Jennifer Berrios - Spouse - [phone]       Who lives at home with you: wife   Do you have any pets? dog and cat  Do you have any services coming to your home (meals on wheels, home health, etc) ?: no    Do you need help with:  Using the phone:  No  Bathing: No  Dressing:  No  Toileting: No  Transportation:  No  Shopping: No  Preparing meals: No  Housework/Laundry: No  Medications: No  Money management: No    Does your home have:  Unsecured throw rugs: No  Grab bars in bathroom: Yes  Walk in shower: No  Seat in shower: No  Lit pathways for night (nightlights): Yes  [facility]

## 2024-08-27 DIAGNOSIS — Z12.5 SCREENING FOR MALIGNANT NEOPLASM OF PROSTATE: Primary | ICD-10-CM

## 2024-08-27 DIAGNOSIS — E03.9 HYPOTHYROIDISM, UNSPECIFIED TYPE: ICD-10-CM

## 2024-08-27 LAB
A/G RATIO: 0.9 (ref 1–2)
ALBUMIN: 4.1 G/DL (ref 3.5–5.7)
ALP BLD-CCNC: 70 U/L (ref 34–104)
ALT SERPL-CCNC: 15 U/L (ref 7–52)
ANION GAP SERPL CALCULATED.3IONS-SCNC: 8 MMOL/L (ref 7–16)
AST SERPL-CCNC: 18 U/L (ref 13–39)
BASOPHILS ABSOLUTE: 0 K/UL (ref 0–0.1)
BASOPHILS RELATIVE PERCENT: 0.6 %
BILIRUB SERPL-MCNC: 0.7 MG/DL (ref 0.3–1)
BUN BLDV-MCNC: 17 MG/DL (ref 7–25)
CALCIUM SERPL-MCNC: 9.7 MG/DL (ref 8.6–10.2)
CHLORIDE BLD-SCNC: 100 MMOL/L (ref 98–107)
CHOLESTEROL, TOTAL: 136 MG/DL
CO2: 30 MMOL/L (ref 21–31)
CREAT SERPL-MCNC: 0.98 MG/DL (ref 0.7–1.3)
EGFR MALE: 85 ML/MIN/1.73M2
EOSINOPHILS ABSOLUTE: 0.2 K/UL (ref 0–0.4)
EOSINOPHILS RELATIVE PERCENT: 2 %
GLOBULIN: 4.4 G/DL (ref 2.6–4.2)
GLUCOSE BLD-MCNC: 103 MG/DL (ref 74–109)
HBA1C MFR BLD: 5.8 % (ref 4–6)
HCT VFR BLD CALC: 43.3 % (ref 39–51.5)
HDLC SERPL-MCNC: 40 MG/DL (ref 40–60)
HEMOGLOBIN: 14.4 G/DL (ref 13.1–17.6)
LDL CHOLESTEROL: 87 MG/DL (ref 0–99)
LYMPHOCYTES ABSOLUTE: 2.5 K/UL (ref 0.8–3.6)
LYMPHOCYTES RELATIVE PERCENT: 33.4 %
MCH RBC QN AUTO: 30.3 PG (ref 28.4–33.4)
MCHC RBC AUTO-ENTMCNC: 33.2 G/DL (ref 31.1–37)
MCV RBC AUTO: 91.4 FL (ref 85–99)
MEAN PLASMA GLUCOSE: 120 MG/DL (ref 68–126)
MONOCYTES ABSOLUTE: 1.1 K/UL (ref 0.3–0.9)
MONOCYTES RELATIVE PERCENT: 14.7 %
NEUTROPHILS ABSOLUTE: 3.6 K/UL (ref 2–7.3)
NEUTROPHILS RELATIVE PERCENT: 49.3 %
PDW BLD-RTO: 13.9 % (ref 11.7–15.2)
PLATELET # BLD: 316 K/UL (ref 154–393)
POTASSIUM SERPL-SCNC: 4.5 MMOL/L (ref 3.5–5.1)
PROSTATE SPECIFIC ANTIGEN: 2.29 NG/ML (ref 0.01–4)
RBC # BLD: 4.74 M/UL (ref 4.3–5.86)
SODIUM BLD-SCNC: 138 MMOL/L (ref 136–145)
T4 FREE: 0.88 NG/DL (ref 0.61–1.12)
TOTAL PROTEIN: 8.5 G/DL (ref 6–8.3)
TRIGL SERPL-MCNC: 43 MG/DL
TSH SERPL DL<=0.05 MIU/L-ACNC: 3.03 UIU/ML (ref 0.45–5.33)
VLDLC SERPL CALC-MCNC: 9 MG/DL (ref 0–40)
WBC # BLD: 7.4 K/UL (ref 4–10.5)

## 2024-08-27 RX ORDER — LEVOTHYROXINE SODIUM 112 UG/1
112 TABLET ORAL DAILY
Qty: 90 TABLET | Refills: 3 | Status: SHIPPED | OUTPATIENT
Start: 2024-08-27

## 2024-08-28 DIAGNOSIS — Z12.5 SCREENING FOR MALIGNANT NEOPLASM OF PROSTATE: Primary | ICD-10-CM

## 2024-10-07 DIAGNOSIS — E03.9 HYPOTHYROIDISM, UNSPECIFIED TYPE: ICD-10-CM

## 2024-10-07 RX ORDER — LEVOTHYROXINE SODIUM 112 UG/1
112 TABLET ORAL DAILY
Qty: 90 TABLET | Refills: 3 | Status: SHIPPED | OUTPATIENT
Start: 2024-10-07

## 2024-11-18 DIAGNOSIS — R97.20 ELEVATED PSA: Primary | ICD-10-CM

## 2025-08-19 ENCOUNTER — TELEPHONE (OUTPATIENT)
Dept: FAMILY MEDICINE CLINIC | Age: 70
End: 2025-08-19

## 2025-08-19 DIAGNOSIS — Z12.5 SCREENING FOR MALIGNANT NEOPLASM OF PROSTATE: ICD-10-CM

## 2025-08-19 DIAGNOSIS — E03.9 HYPOTHYROIDISM, UNSPECIFIED TYPE: Primary | ICD-10-CM

## 2025-08-19 DIAGNOSIS — R73.03 PREDIABETES: ICD-10-CM

## 2025-08-19 DIAGNOSIS — Z13.6 ENCOUNTER FOR SCREENING FOR CARDIOVASCULAR DISORDERS: ICD-10-CM

## 2025-08-19 DIAGNOSIS — Z00.00 ENCOUNTER FOR ANNUAL WELLNESS EXAM IN MEDICARE PATIENT: ICD-10-CM

## 2025-08-19 DIAGNOSIS — Z13.220 SCREENING FOR HYPERLIPIDEMIA: ICD-10-CM

## (undated) DEVICE — MOUTHPIECE ENDOSCP L CTRL OPN AND SIDE PORTS DISP

## (undated) DEVICE — FORCEPS BX L240CM WRK CHN 2.8MM STD CAP W/ NDL MIC MESH

## (undated) DEVICE — GOWN AURORA NONREINF LG: Brand: MEDLINE INDUSTRIES, INC.

## (undated) DEVICE — BW-412T DISP COMBO CLEANING BRUSH: Brand: SINGLE USE COMBINATION CLEANING BRUSH

## (undated) DEVICE — PROCEDURE KIT ENDOSCP CUST

## (undated) DEVICE — 60 ML SYRINGE,REGULAR TIP: Brand: MONOJECT

## (undated) DEVICE — SET VLV 3 PC AWS DISPOSABLE GRDIAN SCOPEVALET

## (undated) DEVICE — SOLUTION IV IRRIG WATER 500ML POUR BRL ST 2F7113